# Patient Record
Sex: FEMALE | Race: BLACK OR AFRICAN AMERICAN | Employment: UNEMPLOYED | URBAN - METROPOLITAN AREA
[De-identification: names, ages, dates, MRNs, and addresses within clinical notes are randomized per-mention and may not be internally consistent; named-entity substitution may affect disease eponyms.]

---

## 2020-10-26 ENCOUNTER — HOSPITAL ENCOUNTER (INPATIENT)
Age: 65
LOS: 10 days | Discharge: HOME HEALTH | DRG: 336 | End: 2020-11-05
Attending: EMERGENCY MEDICINE | Admitting: COLON & RECTAL SURGERY
Payer: MEDICARE

## 2020-10-26 ENCOUNTER — APPOINTMENT (OUTPATIENT)
Dept: GENERAL RADIOLOGY | Age: 65
DRG: 336 | End: 2020-10-26
Attending: COLON & RECTAL SURGERY
Payer: MEDICARE

## 2020-10-26 ENCOUNTER — APPOINTMENT (OUTPATIENT)
Dept: CT IMAGING | Age: 65
DRG: 336 | End: 2020-10-26
Attending: EMERGENCY MEDICINE
Payer: MEDICARE

## 2020-10-26 ENCOUNTER — APPOINTMENT (OUTPATIENT)
Dept: GENERAL RADIOLOGY | Age: 65
DRG: 336 | End: 2020-10-26
Attending: EMERGENCY MEDICINE
Payer: MEDICARE

## 2020-10-26 DIAGNOSIS — K56.609 SBO (SMALL BOWEL OBSTRUCTION) (HCC): ICD-10-CM

## 2020-10-26 LAB
ABO + RH BLD: NORMAL
ALBUMIN SERPL-MCNC: 4.6 G/DL (ref 3.4–5)
ALBUMIN/GLOB SERPL: 1.2 {RATIO} (ref 0.8–1.7)
ALP SERPL-CCNC: 69 U/L (ref 45–117)
ALT SERPL-CCNC: 40 U/L (ref 13–56)
ANION GAP SERPL CALC-SCNC: 12 MMOL/L (ref 3–18)
APTT PPP: 26.8 SEC (ref 23–36.4)
AST SERPL-CCNC: 30 U/L (ref 10–38)
ATRIAL RATE: 59 BPM
BASOPHILS # BLD: 0 K/UL (ref 0–0.1)
BASOPHILS NFR BLD: 0 % (ref 0–2)
BILIRUB SERPL-MCNC: 0.6 MG/DL (ref 0.2–1)
BLOOD GROUP ANTIBODIES SERPL: NORMAL
BUN SERPL-MCNC: 16 MG/DL (ref 7–18)
BUN/CREAT SERPL: 16 (ref 12–20)
CALCIUM SERPL-MCNC: 9.8 MG/DL (ref 8.5–10.1)
CALCULATED P AXIS, ECG09: 41 DEGREES
CALCULATED R AXIS, ECG10: 54 DEGREES
CALCULATED T AXIS, ECG11: 58 DEGREES
CHLORIDE SERPL-SCNC: 98 MMOL/L (ref 100–111)
CO2 SERPL-SCNC: 24 MMOL/L (ref 21–32)
COVID-19 RAPID TEST, COVR: NOT DETECTED
CREAT SERPL-MCNC: 0.97 MG/DL (ref 0.6–1.3)
DIAGNOSIS, 93000: NORMAL
DIFFERENTIAL METHOD BLD: ABNORMAL
EOSINOPHIL # BLD: 0 K/UL (ref 0–0.4)
EOSINOPHIL NFR BLD: 0 % (ref 0–5)
ERYTHROCYTE [DISTWIDTH] IN BLOOD BY AUTOMATED COUNT: 12.5 % (ref 11.6–14.5)
GLOBULIN SER CALC-MCNC: 3.7 G/DL (ref 2–4)
GLUCOSE SERPL-MCNC: 138 MG/DL (ref 74–99)
HCT VFR BLD AUTO: 34.3 % (ref 35–45)
HGB BLD-MCNC: 12 G/DL (ref 12–16)
INR PPP: 1.1 (ref 0.8–1.2)
LACTATE BLD-SCNC: 1.37 MMOL/L (ref 0.4–2)
LIPASE SERPL-CCNC: 208 U/L (ref 73–393)
LYMPHOCYTES # BLD: 1.1 K/UL (ref 0.9–3.6)
LYMPHOCYTES NFR BLD: 15 % (ref 21–52)
MCH RBC QN AUTO: 30.9 PG (ref 24–34)
MCHC RBC AUTO-ENTMCNC: 35 G/DL (ref 31–37)
MCV RBC AUTO: 88.4 FL (ref 74–97)
MONOCYTES # BLD: 0.3 K/UL (ref 0.05–1.2)
MONOCYTES NFR BLD: 3 % (ref 3–10)
NEUTS SEG # BLD: 6 K/UL (ref 1.8–8)
NEUTS SEG NFR BLD: 82 % (ref 40–73)
P-R INTERVAL, ECG05: 84 MS
PLATELET # BLD AUTO: 209 K/UL (ref 135–420)
PMV BLD AUTO: 10.2 FL (ref 9.2–11.8)
POTASSIUM SERPL-SCNC: 4.1 MMOL/L (ref 3.5–5.5)
PROT SERPL-MCNC: 8.3 G/DL (ref 6.4–8.2)
PROTHROMBIN TIME: 13.7 SEC (ref 11.5–15.2)
Q-T INTERVAL, ECG07: 448 MS
QRS DURATION, ECG06: 108 MS
QTC CALCULATION (BEZET), ECG08: 443 MS
RBC # BLD AUTO: 3.88 M/UL (ref 4.2–5.3)
SODIUM SERPL-SCNC: 134 MMOL/L (ref 136–145)
SOURCE, COVRS: NORMAL
SPECIMEN EXP DATE BLD: NORMAL
SPECIMEN TYPE, XMCV1T: NORMAL
TROPONIN I SERPL-MCNC: <0.02 NG/ML (ref 0–0.04)
VENTRICULAR RATE, ECG03: 59 BPM
WBC # BLD AUTO: 7.3 K/UL (ref 4.6–13.2)

## 2020-10-26 PROCEDURE — 85730 THROMBOPLASTIN TIME PARTIAL: CPT

## 2020-10-26 PROCEDURE — 96376 TX/PRO/DX INJ SAME DRUG ADON: CPT

## 2020-10-26 PROCEDURE — 85025 COMPLETE CBC W/AUTO DIFF WBC: CPT

## 2020-10-26 PROCEDURE — 96374 THER/PROPH/DIAG INJ IV PUSH: CPT

## 2020-10-26 PROCEDURE — 2709999900 HC NON-CHARGEABLE SUPPLY

## 2020-10-26 PROCEDURE — 87635 SARS-COV-2 COVID-19 AMP PRB: CPT

## 2020-10-26 PROCEDURE — 74011000250 HC RX REV CODE- 250: Performed by: COLON & RECTAL SURGERY

## 2020-10-26 PROCEDURE — 99223 1ST HOSP IP/OBS HIGH 75: CPT | Performed by: COLON & RECTAL SURGERY

## 2020-10-26 PROCEDURE — 74177 CT ABD & PELVIS W/CONTRAST: CPT

## 2020-10-26 PROCEDURE — 74011250636 HC RX REV CODE- 250/636: Performed by: EMERGENCY MEDICINE

## 2020-10-26 PROCEDURE — 99283 EMERGENCY DEPT VISIT LOW MDM: CPT

## 2020-10-26 PROCEDURE — 84484 ASSAY OF TROPONIN QUANT: CPT

## 2020-10-26 PROCEDURE — 43753 TX GASTRO INTUB W/ASP: CPT

## 2020-10-26 PROCEDURE — 74011250637 HC RX REV CODE- 250/637: Performed by: COLON & RECTAL SURGERY

## 2020-10-26 PROCEDURE — 80053 COMPREHEN METABOLIC PANEL: CPT

## 2020-10-26 PROCEDURE — 74018 RADEX ABDOMEN 1 VIEW: CPT

## 2020-10-26 PROCEDURE — 96375 TX/PRO/DX INJ NEW DRUG ADDON: CPT

## 2020-10-26 PROCEDURE — 85610 PROTHROMBIN TIME: CPT

## 2020-10-26 PROCEDURE — 86900 BLOOD TYPING SEROLOGIC ABO: CPT

## 2020-10-26 PROCEDURE — 74011000636 HC RX REV CODE- 636: Performed by: EMERGENCY MEDICINE

## 2020-10-26 PROCEDURE — 77030008771 HC TU NG SALEM SUMP -A

## 2020-10-26 PROCEDURE — 83690 ASSAY OF LIPASE: CPT

## 2020-10-26 PROCEDURE — 65270000029 HC RM PRIVATE

## 2020-10-26 PROCEDURE — 74011250636 HC RX REV CODE- 250/636: Performed by: COLON & RECTAL SURGERY

## 2020-10-26 PROCEDURE — 71045 X-RAY EXAM CHEST 1 VIEW: CPT

## 2020-10-26 PROCEDURE — 93005 ELECTROCARDIOGRAM TRACING: CPT

## 2020-10-26 PROCEDURE — 83605 ASSAY OF LACTIC ACID: CPT

## 2020-10-26 RX ORDER — ONDANSETRON 2 MG/ML
INJECTION INTRAMUSCULAR; INTRAVENOUS
Status: DISPENSED
Start: 2020-10-26 | End: 2020-10-26

## 2020-10-26 RX ORDER — DIPHENHYDRAMINE HYDROCHLORIDE 50 MG/ML
25 INJECTION, SOLUTION INTRAMUSCULAR; INTRAVENOUS
Status: DISCONTINUED | OUTPATIENT
Start: 2020-10-26 | End: 2020-11-05 | Stop reason: HOSPADM

## 2020-10-26 RX ORDER — ROSUVASTATIN CALCIUM 5 MG/1
5 TABLET, COATED ORAL
COMMUNITY

## 2020-10-26 RX ORDER — MORPHINE SULFATE 4 MG/ML
4 INJECTION, SOLUTION INTRAMUSCULAR; INTRAVENOUS
Status: COMPLETED | OUTPATIENT
Start: 2020-10-26 | End: 2020-10-26

## 2020-10-26 RX ORDER — METOPROLOL TARTRATE 25 MG/1
25 TABLET, FILM COATED ORAL 2 TIMES DAILY
COMMUNITY

## 2020-10-26 RX ORDER — MORPHINE SULFATE 2 MG/ML
2-4 INJECTION, SOLUTION INTRAMUSCULAR; INTRAVENOUS
Status: DISCONTINUED | OUTPATIENT
Start: 2020-10-26 | End: 2020-11-05 | Stop reason: HOSPADM

## 2020-10-26 RX ORDER — MORPHINE SULFATE 2 MG/ML
2 INJECTION, SOLUTION INTRAMUSCULAR; INTRAVENOUS
Status: DISCONTINUED | OUTPATIENT
Start: 2020-10-26 | End: 2020-10-26

## 2020-10-26 RX ORDER — HEPARIN SODIUM 5000 [USP'U]/ML
5000 INJECTION, SOLUTION INTRAVENOUS; SUBCUTANEOUS EVERY 8 HOURS
Status: DISCONTINUED | OUTPATIENT
Start: 2020-10-26 | End: 2020-11-05 | Stop reason: HOSPADM

## 2020-10-26 RX ORDER — SODIUM CHLORIDE, SODIUM LACTATE, POTASSIUM CHLORIDE, CALCIUM CHLORIDE 600; 310; 30; 20 MG/100ML; MG/100ML; MG/100ML; MG/100ML
125 INJECTION, SOLUTION INTRAVENOUS CONTINUOUS
Status: DISCONTINUED | OUTPATIENT
Start: 2020-10-26 | End: 2020-11-02

## 2020-10-26 RX ORDER — ONDANSETRON 2 MG/ML
4 INJECTION INTRAMUSCULAR; INTRAVENOUS ONCE
Status: COMPLETED | OUTPATIENT
Start: 2020-10-26 | End: 2020-10-26

## 2020-10-26 RX ORDER — CLONIDINE 0.1 MG/24H
1 PATCH, EXTENDED RELEASE TRANSDERMAL
Status: DISCONTINUED | OUTPATIENT
Start: 2020-10-26 | End: 2020-11-05 | Stop reason: HOSPADM

## 2020-10-26 RX ORDER — MORPHINE SULFATE 4 MG/ML
4 INJECTION, SOLUTION INTRAMUSCULAR; INTRAVENOUS
Status: DISCONTINUED | OUTPATIENT
Start: 2020-10-26 | End: 2020-10-26 | Stop reason: SDUPTHER

## 2020-10-26 RX ORDER — ONDANSETRON 2 MG/ML
4 INJECTION INTRAMUSCULAR; INTRAVENOUS
Status: DISCONTINUED | OUTPATIENT
Start: 2020-10-26 | End: 2020-11-05 | Stop reason: HOSPADM

## 2020-10-26 RX ORDER — LISINOPRIL AND HYDROCHLOROTHIAZIDE 20; 25 MG/1; MG/1
1 TABLET ORAL DAILY
COMMUNITY

## 2020-10-26 RX ORDER — AMLODIPINE BESYLATE 5 MG/1
5 TABLET ORAL DAILY
COMMUNITY

## 2020-10-26 RX ADMIN — MORPHINE SULFATE 2 MG: 2 INJECTION, SOLUTION INTRAMUSCULAR; INTRAVENOUS at 22:46

## 2020-10-26 RX ADMIN — MORPHINE SULFATE 4 MG: 4 INJECTION, SOLUTION INTRAMUSCULAR; INTRAVENOUS at 04:11

## 2020-10-26 RX ADMIN — FAMOTIDINE 20 MG: 10 INJECTION INTRAVENOUS at 08:34

## 2020-10-26 RX ADMIN — ONDANSETRON 4 MG: 2 INJECTION INTRAMUSCULAR; INTRAVENOUS at 12:53

## 2020-10-26 RX ADMIN — ONDANSETRON 4 MG: 2 INJECTION INTRAMUSCULAR; INTRAVENOUS at 04:11

## 2020-10-26 RX ADMIN — MORPHINE SULFATE 2 MG: 2 INJECTION, SOLUTION INTRAMUSCULAR; INTRAVENOUS at 20:19

## 2020-10-26 RX ADMIN — HEPARIN SODIUM 5000 UNITS: 5000 INJECTION INTRAVENOUS; SUBCUTANEOUS at 15:08

## 2020-10-26 RX ADMIN — SODIUM CHLORIDE, SODIUM LACTATE, POTASSIUM CHLORIDE, AND CALCIUM CHLORIDE 125 ML/HR: 600; 310; 30; 20 INJECTION, SOLUTION INTRAVENOUS at 22:47

## 2020-10-26 RX ADMIN — MORPHINE SULFATE 2 MG: 2 INJECTION, SOLUTION INTRAMUSCULAR; INTRAVENOUS at 12:45

## 2020-10-26 RX ADMIN — MORPHINE SULFATE 4 MG: 2 INJECTION, SOLUTION INTRAMUSCULAR; INTRAVENOUS at 09:18

## 2020-10-26 RX ADMIN — MORPHINE SULFATE 4 MG: 4 INJECTION, SOLUTION INTRAMUSCULAR; INTRAVENOUS at 06:10

## 2020-10-26 RX ADMIN — ONDANSETRON 4 MG: 2 INJECTION INTRAMUSCULAR; INTRAVENOUS at 20:19

## 2020-10-26 RX ADMIN — MORPHINE SULFATE 4 MG: 4 INJECTION, SOLUTION INTRAMUSCULAR; INTRAVENOUS at 02:13

## 2020-10-26 RX ADMIN — IOPAMIDOL 100 ML: 612 INJECTION, SOLUTION INTRAVENOUS at 04:26

## 2020-10-26 RX ADMIN — SODIUM CHLORIDE, SODIUM LACTATE, POTASSIUM CHLORIDE, AND CALCIUM CHLORIDE 125 ML/HR: 600; 310; 30; 20 INJECTION, SOLUTION INTRAVENOUS at 07:29

## 2020-10-26 RX ADMIN — ONDANSETRON 4 MG: 2 INJECTION INTRAMUSCULAR; INTRAVENOUS at 02:13

## 2020-10-26 NOTE — ED TRIAGE NOTES
Patient complaints of diffused lower abdominal pain for the past 2 hours and denies any nausea,vomiting and diarrhea symptoms at this time. Last BM was last night around 7 pm and had to strain per patient.

## 2020-10-26 NOTE — PROGRESS NOTES
Bedside and Verbal shift change report given to Luis Byrnes RN (oncoming nurse) by Curt Gonzalez (offgoing nurse). Report included the following information SBAR and Kardex.

## 2020-10-26 NOTE — H&P
HPI: Philip Candelario is a 71 y.o. female presenting with chief complain of abdominal pain. Asked to see patient as an emergency consult, stat, by emergency room physician. Patient developed abdominal pain at approximately 6 PM yesterday evening. The pain was severe, diffuse, more so in the lower abdomen. She denies any prior history of abdominal pain issues. She is status post hysterectomy. She has no history of small bowel obstruction. She denies diarrhea. Although she did not vomit at home she had one vomiting episode in the emergency room. Since admission to the ER nasogastric decompression tube has been placed. PMH: hypertension    PSH: hysterectomy    [address] negative for gastrointestinal disorders    Social History     Socioeconomic History    Marital status:      Spouse name: Not on file    Number of children: Not on file    Years of education: Not on file    Highest education level: Not on file   Tobacco Use    Smoking status: Former Smoker    Smokeless tobacco: Never Used   Substance and Sexual Activity    Drug use: Never       Review of Systems -aside from the above, all others negative      No Known Allergies    Vitals:    10/26/20 0049   BP: 128/74   Pulse: 63   Resp: 20   Temp: 98.3 °F (36.8 °C)   SpO2: 99%   Weight: 56.7 kg (125 lb)   Height: 5' 3\" (1.6 m)       Physical Exam  Constitutional:       Appearance: She is well-developed. HENT:      Head: Normocephalic and atraumatic. Eyes:      Conjunctiva/sclera: Conjunctivae normal.   Abdominal:      General: There is no distension. Palpations: Abdomen is soft. Tenderness: There is abdominal tenderness (Moderate, diffuse). There is no guarding. Hernia: No hernia is present. Musculoskeletal: Normal range of motion. Lymphadenopathy:      Cervical: No cervical adenopathy. Skin:     General: Skin is warm and dry. Findings: No rash. Neurological:      Sensory: No sensory deficit.    Psychiatric:         Speech: Speech normal.       CMP:   Lab Results   Component Value Date/Time     (L) 10/26/2020 02:02 AM    K 4.1 10/26/2020 02:02 AM    CL 98 (L) 10/26/2020 02:02 AM    CO2 24 10/26/2020 02:02 AM    AGAP 12 10/26/2020 02:02 AM     (H) 10/26/2020 02:02 AM    BUN 16 10/26/2020 02:02 AM    CREA 0.97 10/26/2020 02:02 AM    GFRAA >60 10/26/2020 02:02 AM    GFRNA 58 (L) 10/26/2020 02:02 AM    CA 9.8 10/26/2020 02:02 AM    ALB 4.6 10/26/2020 02:02 AM    TP 8.3 (H) 10/26/2020 02:02 AM    GLOB 3.7 10/26/2020 02:02 AM    AGRAT 1.2 10/26/2020 02:02 AM    ALT 40 10/26/2020 02:02 AM     CBC:   Lab Results   Component Value Date/Time    WBC 7.3 10/26/2020 02:02 AM    HGB 12.0 10/26/2020 02:02 AM    HCT 34.3 (L) 10/26/2020 02:02 AM     10/26/2020 02:02 AM     Lactate 1.3    CT abdomen pelvis personally visualized by me; there is a dilated loop in the pelvis which might raise concern for a closed-loop small bowel obstruction, however there are also dilated loops in the upper abdomen. There is no substantial free fluid, nor free air. The bowel loops do not appear particularly thickened. Assessment / Plan    Small bowel obstruction, possibly a closed-loop obstruction  We will admit the patient for nasogastric decompression and close observation  If her symptoms do not improve, she may require exploratory laparotomy  Hold BP meds for now  Type and screen sent, Covid test negative    The diagnoses and plan were discussed with the patient. All questions answered. Plan of care agreed to by all concerned.

## 2020-10-26 NOTE — PROGRESS NOTES
Pt states pain substantially improved.   Afebrile  Abd soft, mild tenderness lower abdomen, ND    SBO  Continue conservatives measures  Follow closely

## 2020-10-26 NOTE — ED PROVIDER NOTES
EMERGENCY DEPARTMENT HISTORY AND PHYSICAL EXAM      Date: 10/26/2020  Patient Name: [facility]    History of Presenting Illness     Chief Complaint   Patient presents with    Abdominal Pain       History (Context): [facility] is a 71 y.o. Lynnette Nancy with hypertension, hyperlipidemia and a remote YOGI, who presents with 1 hour of acute onset, severe, persistent suprapubic abdominal pain associated with nausea and vomiting. The patient pain is described as sharp and stabbing. The pain was so severe that it woke her out of a deep sleep. No clear exacerbating/relieving features or other associated symptoms. On review of systems, the patient denies fever, chills, diarrhea, back pain, chest pain, shortness of breath, diaphoresis, rashes, tick bite, recent travel. PCP: Sudha Mcgee MD    Current Facility-Administered Medications   Medication Dose Route Frequency Provider Last Rate Last Dose    piperacillin-tazobactam (ZOSYN) 3.375 g in 0.9% sodium chloride (MBP/ADV) 100 mL MBP  3.375 g IntraVENous NOW Julita Stone MD        diphenhydrAMINE (BENADRYL) injection 25 mg  25 mg IntraVENous Q6H PRN Josie Dowell MD        heparin (porcine) injection 5,000 Units  5,000 Units SubCUTAneous Q8H Josie Dowell MD        lactated Ringers infusion  125 mL/hr IntraVENous CONTINUOUS Josie Dowell MD        famotidine (PF) (PEPCID) 20 mg in 0.9% sodium chloride 10 mL injection  20 mg IntraVENous Q12H Josie Dowell MD        ondansetron [facility]) injection 4 mg  4 mg IntraVENous Q6H PRN Josie Dowell MD        morphine injection 2-4 mg  2-4 mg IntraVENous Q3H PRN Josie Dowell MD           Past History     Past Medical History:  Hypertension  Hyperlipidemia    Past Surgical History:  History reviewed. No pertinent surgical history. Family History:  History reviewed. No pertinent family history.     Social History:  Social History     Tobacco Use    Smoking status: Former Smoker  Smokeless tobacco: Never Used   Substance Use Topics    Alcohol use: Not on file    Drug use: Never       Allergies:  No Known Allergies      Review of Systems   As per HPI, otherwise reviewed and negative. Physical Exam     Vitals:    10/26/20 0049   BP: 128/74   Pulse: 63   Resp: 20   Temp: 98.3 °F (36.8 °C)   SpO2: 99%   Weight: 56.7 kg (125 lb)   Height: 5' 3\" (1.6 m)       Gen: Very uncomfortable, in very severe pain  HEENT: Normocephalic, sclera anicteric  Cardiovascular: Normal rate, regular rhythm, no murmurs, rubs, gallops. Pulses intact and equal distally. Pulmonary: No respiratory distress. No stridor. Clear lungs. ABD: Soft,  tender in the suprapubic area, nondistended. Neuro: Alert. Normal speech. Normal mentation. Psych: Normal thought content and thought processes. : No CVA tenderness  EXT: Moves all extremities well. No cyanosis or clubbing. Skin: Warm and well-perfused. Diagnostic Study Results     Labs -     Recent Results (from the past 12 hour(s))   CBC WITH AUTOMATED DIFF    Collection Time: 10/26/20  2:02 AM   Result Value Ref Range    WBC 7.3 4.6 - 13.2 K/uL    RBC 3.88 (L) 4.20 - 5.30 M/uL    HGB 12.0 12.0 - 16.0 g/dL    HCT 34.3 (L) 35.0 - 45.0 %    MCV 88.4 74.0 - 97.0 FL    MCH 30.9 24.0 - 34.0 PG    MCHC 35.0 31.0 - 37.0 g/dL    RDW 12.5 11.6 - 14.5 %    PLATELET 312 047 - 386 K/uL    MPV 10.2 9.2 - 11.8 FL    NEUTROPHILS 82 (H) 40 - 73 %    LYMPHOCYTES 15 (L) 21 - 52 %    MONOCYTES 3 3 - 10 %    EOSINOPHILS 0 0 - 5 %    BASOPHILS 0 0 - 2 %    ABS. NEUTROPHILS 6.0 1.8 - 8.0 K/UL    ABS. LYMPHOCYTES 1.1 0.9 - 3.6 K/UL    ABS. MONOCYTES 0.3 0.05 - 1.2 K/UL    ABS. EOSINOPHILS 0.0 0.0 - 0.4 K/UL    ABS.  BASOPHILS 0.0 0.0 - 0.1 K/UL    DF AUTOMATED     METABOLIC PANEL, COMPREHENSIVE    Collection Time: 10/26/20  2:02 AM   Result Value Ref Range    Sodium 134 (L) 136 - 145 mmol/L    Potassium 4.1 3.5 - 5.5 mmol/L    Chloride 98 (L) 100 - 111 mmol/L    CO2 24 21 - 32 mmol/L    Anion gap 12 3.0 - 18 mmol/L    Glucose 138 (H) 74 - 99 mg/dL    BUN 16 7.0 - 18 MG/DL    Creatinine 0.97 0.6 - 1.3 MG/DL    BUN/Creatinine ratio 16 12 - 20      GFR est AA >60 >60 ml/min/1.73m2    GFR est non-AA 58 (L) >60 ml/min/1.73m2    Calcium 9.8 8.5 - 10.1 MG/DL    Bilirubin, total 0.6 0.2 - 1.0 MG/DL    ALT (SGPT) 40 13 - 56 U/L    AST (SGOT) 30 10 - 38 U/L    Alk. phosphatase 69 45 - 117 U/L    Protein, total 8.3 (H) 6.4 - 8.2 g/dL    Albumin 4.6 3.4 - 5.0 g/dL    Globulin 3.7 2.0 - 4.0 g/dL    A-G Ratio 1.2 0.8 - 1.7     LIPASE    Collection Time: 10/26/20  2:02 AM   Result Value Ref Range    Lipase 208 73 - 393 U/L   TROPONIN I    Collection Time: 10/26/20  2:02 AM   Result Value Ref Range    Troponin-I, QT <0.02 0.0 - 0.045 NG/ML   EKG, 12 LEAD, INITIAL    Collection Time: 10/26/20  3:39 AM   Result Value Ref Range    Ventricular Rate 59 BPM    Atrial Rate 59 BPM    P-R Interval 84 ms    QRS Duration 108 ms    Q-T Interval 448 ms    QTC Calculation (Bezet) 443 ms    Calculated P Axis 41 degrees    Calculated R Axis 54 degrees    Calculated T Axis 58 degrees    Diagnosis       Sinus bradycardia with short MS  Otherwise normal ECG  No previous ECGs available     TYPE & SCREEN    Collection Time: 10/26/20  5:00 AM   Result Value Ref Range    Crossmatch Expiration 10/29/2020,2359     ABO/Rh(D) B POSITIVE     Antibody screen NEG    SARS-COV-2    Collection Time: 10/26/20  5:40 AM   Result Value Ref Range    Specimen source Nasopharyngeal      COVID-19 rapid test Not detected NOTD      Specimen type NP Swab     POC LACTIC ACID    Collection Time: 10/26/20  5:43 AM   Result Value Ref Range    Lactic Acid (POC) 1.37 0.40 - 2.00 mmol/L       Radiologic Studies -   CT ABD PELV W CONT   Final Result   IMPRESSION:    1.  Fluid-filled and focally dilated small bowel loop in the pelvis, with   associated wall thickening and surrounding fluid, as well as abrupt transition   points at either and, is concerning for closed loop bowel obstruction. -Multiple additional mildly dilated and fluid-filled small bowel loops in the   mid abdomen, without wall thickening or surrounding stranding, may represent   another point of early or partial obstruction   -Recommend surgical consultation      Enedina Gay M.D. discussed the above with Dr. Davon Weaver at 4:55 AM on   10/26/2020. XR CHEST PORT    (Results Pending)   XR ABD PORT  1 V    (Results Pending)     CT Results  (Last 48 hours)               10/26/20 0426  CT ABD PELV W CONT Final result    Impression:  IMPRESSION:    1. Fluid-filled and focally dilated small bowel loop in the pelvis, with   associated wall thickening and surrounding fluid, as well as abrupt transition   points at either and, is concerning for closed loop bowel obstruction. -Multiple additional mildly dilated and fluid-filled small bowel loops in the   mid abdomen, without wall thickening or surrounding stranding, may represent   another point of early or partial obstruction   -Recommend surgical consultation       Enedina Gay M.D. discussed the above with Dr. Davon Weaver at 4:55 AM on   10/26/2020. Narrative:  EXAM: CT ABDOMEN AND PELVIS WITH CONTRAST       CLINICAL INDICATION/HISTORY: Abdominal pain . Diffuse lower abdominal pain for 2   hours prior to arrival.        COMPARISON: None       TECHNIQUE:  CT abdomen and pelvis with 100 cc of Isovue-300 IV contrast.   All CT scans at this facility are performed using dose optimization technique as   appropriate to the performed examination, to include automated exposure control,   adjustment of the mA and/or kV according to patient's size (including   appropriate matching for site-specific examinations), or use of an iterative   reconstruction technique. FINDINGS:        Lower chest: Unremarkable. Liver: A few tiny hypodensities in the liver are too small to characterize, but   likely benign.         Biliary: Unremarkable. Pancreas: Unremarkable. Spleen: Unremarkable. Adrenal glands: Unremarkable. Kidneys: No hydronephrosis or renal stones. Few subcentimeter hypodensities in   the bilateral kidneys are too small to characterize, but likely cysts. Reproductive organs: Hysterectomy. Bladder: Unremarkable. Bowel: The majority of the small bowel appears normal. However, in the mid   abdomen, there are a fewer loops of mildly prominent fluid-filled small bowel,   without wall thickening. A transition to normal caliber bowel is seen in the   left mid abdomen on coronal image 25 and in the mid abdomen on coronal image 28. Just distal to this transition point, there is another small bowel loop in the   pelvis which is distended with fluid to 2.5 cm, with abrupt transition at both   ends (series 2, image 63) and associated wall thickening. Lymph nodes: No pathologically enlarged lymph nodes. Vasculature: Moderate burden of calcified atherosclerotic disease. Other: Small amount of free fluid in the pelvis. Body wall: Unremarkable. Bones: No acute osseous abnormality. CXR Results  (Last 48 hours)    None            Medical Decision Making   I am the first provider for this patient. I reviewed the vital signs, available nursing notes, past medical history, past surgical history, family history and social history. Vital Signs-Reviewed the patient's vital signs. EKG: Interpreted by myself. Records Reviewed: By myself personally on initial evaluation    MDM:   Patient presents with abdominal pain. Exam significant for tenderness in the suprapubic area. Given the abrupt onset and severity of abdominal pain, there is almost assuredly an acute surgical emergency. The patient does not frequent emergency rooms, and the pain woke her up out of her sleep.   This history signifies pain is ischemic in nature, and the patient will likely need an operation in the coming hours. DDX considered: Perforated hollow viscus, ureterolithiasis, SBO, mesenteric ischemia, gastroparesis, aortic dissection      Plan:   Pain Control  Antiemetics  Close Observation    Orders as below:  Orders Placed This Encounter    CT ABD PELV W CONT    XR CHEST PORT    XR ABD PORT  1 V    CBC WITH AUTOMATED DIFF    COMPREHENSIVE METABOLIC PANEL    LIPASE    TROPONIN I    PROTHROMBIN TIME + INR    PTT    LACTIC ACID    SARS-COV-2    METABOLIC PANEL, BASIC    CBC W/O DIFF    MAGNESIUM    PHOSPHORUS    DIET NPO Except Meds    NG TUBE, INSERTION    INTAKE AND OUTPUT    VITAL SIGNS PER UNIT ROUTINE    NG TUBE TO SUCTION CONTINUOUS Type of suction: Intermittent; Suction Settings: Low    POC LACTIC ACID    EKG, 12 LEAD, INITIAL    TYPE & SCREEN    morphine injection 4 mg    ondansetron (ZOFRAN) 4 mg/2 mL injection    ondansetron (ZOFRAN) injection 4 mg    morphine injection 4 mg    ondansetron (ZOFRAN) injection 4 mg    piperacillin-tazobactam (ZOSYN) 3.375 g in 0.9% sodium chloride (MBP/ADV) 100 mL MBP    ondansetron (ZOFRAN) injection 4 mg    morphine injection 2-4 mg    INITIAL PHYSICIAN ORDER: INPATIENT Surgical; 1. Patient Failed outpatient treatment (further clarification in H&P documentation)        ED Course:   ED Course as of Oct 26 0635   Mon Oct 26, 2020   5191 After I personally reviewed the CT scan and determined I felt it was a closed-loop obstruction, especially given the patient's presentation, the radiologist called and independently suggested a closed-loop obstruction. The patient has pain local to the area where the closed-loop obstruction would be, and further she has a history of a hysterectomy in that region.   However, in consultation with general surgery, Dr. Mona Ferrara, he says he thinks this is a \"mild partial small bowel obstruction,\" and asked me to [facility] admit this patient to the hospitalist, and I will see her sometime this morning. \"  I have insisted upon his immediate consultation in the emergency room. He will oblige. [DT]   4999 According to surgery, the patient will be admitted and not have surgery. I have quoted Bhavna et al who clearly demonstrated the error in this thought process and the overreliance of clinical signs, over history and radiologic signs: \"Early recognition of intestinal strangulation in patients with small bowel obstruction is essential to allow safe nonoperative management of selected patients. We prospectively evaluated preoperative diagnostic parameters as well as the preoperative judgement of the senior attending surgeon for the determination of the presence or absence of intestinal strangulation in 51 consecutive patients who were about to undergo laparotomy for complete mechanical small bowel obstruction. Strangulation was present in 21 (42 percent) of the 51 patients. No preoperative clinical parameter, including the presence of continuous abdominal pain, fever, peritoneal signs, leukocytosis, or acidosis, or a combination thereof proved to be sensitive, specific, and predictive for strangulation. Moreover, the senior surgeon's experienced clinical judgement detected strangulation in only 10 of 21 patients with strangulation preoperatively (sensitivity, 48 percent). Indeed, only 1 of these 10 patients had an early, reversible lesion, whereas 9 had advanced, irreversible infarction. Only 25 of 36 preoperative assessments of simple obstruction proved correct (predictive value of an assessment of no strangulation, 69 percent). Overall, the preoperative assessment was correct in only 35 of the 51 patients (efficiency, 70 percent). These data show that in patients with complete mechanical small bowel obstruction, the preoperative diagnosis of strangulation cannot be made or excluded reliably by any known clinical parameter, combination of parameters, or by experienced clinical judgement. .. [DT]   3115 . Diandra Boop Diandra Boop Nonoperative management of complete intestinal obstruction is therefore undertaken at a calculated risk (31 +/- 51 percent in the present series) of delaying definitive treatment of intestinal ischemia. \"      [DT]      ED Course User Index  [DT] Royce Polk MD         Disposition:  Admit      Critical Care Time:       Diagnosis     Clinical Impression:   1. SBO (small bowel obstruction) ([facility])        Signed,  Gaetano Garcia MD  Emergency Physician  SHIRIN Blair    As a voice dictation software was utilized to dictate this note, minor word transpositions can occur. I apologize for confusing wording and typographic errors. Please feel free to contact me for clarification.

## 2020-10-27 LAB
ANION GAP SERPL CALC-SCNC: 7 MMOL/L (ref 3–18)
BUN SERPL-MCNC: 13 MG/DL (ref 7–18)
BUN/CREAT SERPL: 17 (ref 12–20)
CALCIUM SERPL-MCNC: 9.2 MG/DL (ref 8.5–10.1)
CHLORIDE SERPL-SCNC: 100 MMOL/L (ref 100–111)
CO2 SERPL-SCNC: 29 MMOL/L (ref 21–32)
CREAT SERPL-MCNC: 0.76 MG/DL (ref 0.6–1.3)
ERYTHROCYTE [DISTWIDTH] IN BLOOD BY AUTOMATED COUNT: 12.4 % (ref 11.6–14.5)
GLUCOSE SERPL-MCNC: 116 MG/DL (ref 74–99)
HCT VFR BLD AUTO: 35.5 % (ref 35–45)
HGB BLD-MCNC: 12.2 G/DL (ref 12–16)
MAGNESIUM SERPL-MCNC: 2.1 MG/DL (ref 1.6–2.6)
MCH RBC QN AUTO: 30.4 PG (ref 24–34)
MCHC RBC AUTO-ENTMCNC: 34.4 G/DL (ref 31–37)
MCV RBC AUTO: 88.5 FL (ref 74–97)
PHOSPHATE SERPL-MCNC: 3.6 MG/DL (ref 2.5–4.9)
PLATELET # BLD AUTO: 207 K/UL (ref 135–420)
PMV BLD AUTO: 9.7 FL (ref 9.2–11.8)
POTASSIUM SERPL-SCNC: 3.2 MMOL/L (ref 3.5–5.5)
RBC # BLD AUTO: 4.01 M/UL (ref 4.2–5.3)
SODIUM SERPL-SCNC: 136 MMOL/L (ref 136–145)
WBC # BLD AUTO: 8.3 K/UL (ref 4.6–13.2)

## 2020-10-27 PROCEDURE — 74011250636 HC RX REV CODE- 250/636: Performed by: COLON & RECTAL SURGERY

## 2020-10-27 PROCEDURE — 74011000250 HC RX REV CODE- 250: Performed by: COLON & RECTAL SURGERY

## 2020-10-27 PROCEDURE — 65270000029 HC RM PRIVATE

## 2020-10-27 PROCEDURE — 84100 ASSAY OF PHOSPHORUS: CPT

## 2020-10-27 PROCEDURE — 80048 BASIC METABOLIC PNL TOTAL CA: CPT

## 2020-10-27 PROCEDURE — 99232 SBSQ HOSP IP/OBS MODERATE 35: CPT | Performed by: COLON & RECTAL SURGERY

## 2020-10-27 PROCEDURE — 36415 COLL VENOUS BLD VENIPUNCTURE: CPT

## 2020-10-27 PROCEDURE — 85027 COMPLETE CBC AUTOMATED: CPT

## 2020-10-27 PROCEDURE — 2709999900 HC NON-CHARGEABLE SUPPLY

## 2020-10-27 PROCEDURE — 83735 ASSAY OF MAGNESIUM: CPT

## 2020-10-27 RX ORDER — POTASSIUM CHLORIDE 7.45 MG/ML
10 INJECTION INTRAVENOUS
Status: COMPLETED | OUTPATIENT
Start: 2020-10-27 | End: 2020-10-27

## 2020-10-27 RX ADMIN — MORPHINE SULFATE 4 MG: 2 INJECTION, SOLUTION INTRAMUSCULAR; INTRAVENOUS at 09:03

## 2020-10-27 RX ADMIN — POTASSIUM CHLORIDE 10 MEQ: 7.46 INJECTION, SOLUTION INTRAVENOUS at 10:05

## 2020-10-27 RX ADMIN — MORPHINE SULFATE 2 MG: 2 INJECTION, SOLUTION INTRAMUSCULAR; INTRAVENOUS at 14:42

## 2020-10-27 RX ADMIN — HEPARIN SODIUM 5000 UNITS: 5000 INJECTION INTRAVENOUS; SUBCUTANEOUS at 21:51

## 2020-10-27 RX ADMIN — POTASSIUM CHLORIDE 10 MEQ: 7.46 INJECTION, SOLUTION INTRAVENOUS at 07:42

## 2020-10-27 RX ADMIN — POTASSIUM CHLORIDE 10 MEQ: 7.46 INJECTION, SOLUTION INTRAVENOUS at 08:58

## 2020-10-27 RX ADMIN — FAMOTIDINE 20 MG: 10 INJECTION INTRAVENOUS at 09:03

## 2020-10-27 RX ADMIN — HEPARIN SODIUM 5000 UNITS: 5000 INJECTION INTRAVENOUS; SUBCUTANEOUS at 14:42

## 2020-10-27 RX ADMIN — FAMOTIDINE 20 MG: 10 INJECTION INTRAVENOUS at 21:15

## 2020-10-27 RX ADMIN — SODIUM CHLORIDE, SODIUM LACTATE, POTASSIUM CHLORIDE, AND CALCIUM CHLORIDE 125 ML/HR: 600; 310; 30; 20 INJECTION, SOLUTION INTRAVENOUS at 06:11

## 2020-10-27 RX ADMIN — HEPARIN SODIUM 5000 UNITS: 5000 INJECTION INTRAVENOUS; SUBCUTANEOUS at 06:11

## 2020-10-27 RX ADMIN — MORPHINE SULFATE 2 MG: 2 INJECTION, SOLUTION INTRAMUSCULAR; INTRAVENOUS at 01:16

## 2020-10-27 NOTE — PROGRESS NOTES
[address] [facility] [address]  [phone]  Colon and Rectal Surgery Progress Note      Patient: [facility] MRN: [id number]  SSN: [id number]    YOB: 1955  Age: 71 y.o. Sex: female      Admit Date: 10/26/2020    LOS: 1 day     Subjective:     Pain intermittent. No bowel function. Objective:     Vitals:    10/26/20 1608 10/26/20 2034 10/27/20 0007 10/27/20 0359   BP: (!) 157/72 139/64 123/64 116/70   Pulse: 60 64 74 70   Resp: 16 18 16 18   Temp: 97.7 °F (36.5 °C) 98.2 °F (36.8 °C) 98 °F (36.7 °C) 98.7 °F (37.1 °C)   SpO2: 99% 99% 98% 97%   Weight:       Height:            Intake and Output:  Current Shift: No intake/output data recorded.   Last three shifts: 10/25 1901 - 10/27 0700  In: 1160.4 [I.V.:1160.4]  Out: 400 [Urine:400]    Physical Exam:     Constitutional: well developed, in NAD  Abdomen: soft, mildly tender R and LLQ, ND, no guarding    Lab/Data Review:    CMP:   Lab Results   Component Value Date/Time     10/27/2020 02:37 AM    K 3.2 (L) 10/27/2020 02:37 AM     10/27/2020 02:37 AM    CO2 29 10/27/2020 02:37 AM    AGAP 7 10/27/2020 02:37 AM     (H) 10/27/2020 02:37 AM    BUN 13 10/27/2020 02:37 AM    CREA 0.76 10/27/2020 02:37 AM    GFRAA >60 10/27/2020 02:37 AM    GFRNA >60 10/27/2020 02:37 AM    CA 9.2 10/27/2020 02:37 AM    MG 2.1 10/27/2020 02:37 AM    PHOS 3.6 10/27/2020 02:37 AM     CBC:   Lab Results   Component Value Date/Time    WBC 8.3 10/27/2020 02:37 AM    HGB 12.2 10/27/2020 02:37 AM    HCT 35.5 10/27/2020 02:37 AM     10/27/2020 02:37 AM        Assessment:     SBO    Plan:     Continue NPO, NG, IVF  Hypokalemia - replete K    Signed By: Mykel Maciel MD        October 27, 2020

## 2020-10-27 NOTE — PROGRESS NOTES
Problem: Pain  Goal: *Control of Pain  Outcome: Progressing Towards Goal     Problem: Patient Education: Go to Patient Education Activity  Goal: Patient/Family Education  Outcome: Progressing Towards Goal     Problem: Falls - Risk of  Goal: *Absence of Falls  Description: Document Ivbeau Kirkland Fall Risk and appropriate interventions in the flowsheet.   Outcome: Progressing Towards Goal  Note: Fall Risk Interventions:       Mentation Interventions: Bed/chair exit alarm    Medication Interventions: Patient to call before getting OOB, Teach patient to arise slowly                   Problem: Patient Education: Go to Patient Education Activity  Goal: Patient/Family Education  Outcome: Progressing Towards Goal

## 2020-10-27 NOTE — PROGRESS NOTES
Bedside and Verbal shift change report given to Sarah Beth RN/Tiki RN by [employer]. Report included information from the following: SBAR, Kardex, and MAR.

## 2020-10-27 NOTE — PROGRESS NOTES
Problem: Pain  Goal: *Control of Pain  Outcome: Progressing Towards Goal     Problem: Patient Education: Go to Patient Education Activity  Goal: Patient/Family Education  Outcome: Progressing Towards Goal     Problem: Falls - Risk of  Goal: *Absence of Falls  Description: Document José Antonio Going Fall Risk and appropriate interventions in the flowsheet.   Outcome: Progressing Towards Goal  Note: Fall Risk Interventions:     Medication Interventions: Teach patient to arise slowly, Patient to call before getting OOB    Problem: Patient Education: Go to Patient Education Activity  Goal: Patient/Family Education  Outcome: Progressing Towards Goal

## 2020-10-28 ENCOUNTER — APPOINTMENT (OUTPATIENT)
Dept: GENERAL RADIOLOGY | Age: 65
DRG: 336 | End: 2020-10-28
Attending: COLON & RECTAL SURGERY
Payer: MEDICARE

## 2020-10-28 ENCOUNTER — APPOINTMENT (OUTPATIENT)
Dept: CT IMAGING | Age: 65
DRG: 336 | End: 2020-10-28
Attending: COLON & RECTAL SURGERY
Payer: MEDICARE

## 2020-10-28 LAB
ANION GAP SERPL CALC-SCNC: 6 MMOL/L (ref 3–18)
BUN SERPL-MCNC: 10 MG/DL (ref 7–18)
BUN/CREAT SERPL: 14 (ref 12–20)
CALCIUM SERPL-MCNC: 9.5 MG/DL (ref 8.5–10.1)
CHLORIDE SERPL-SCNC: 105 MMOL/L (ref 100–111)
CO2 SERPL-SCNC: 29 MMOL/L (ref 21–32)
CREAT SERPL-MCNC: 0.7 MG/DL (ref 0.6–1.3)
ERYTHROCYTE [DISTWIDTH] IN BLOOD BY AUTOMATED COUNT: 12.6 % (ref 11.6–14.5)
GLUCOSE SERPL-MCNC: 84 MG/DL (ref 74–99)
HCT VFR BLD AUTO: 34.4 % (ref 35–45)
HGB BLD-MCNC: 11.6 G/DL (ref 12–16)
MAGNESIUM SERPL-MCNC: 2.2 MG/DL (ref 1.6–2.6)
MCH RBC QN AUTO: 30.1 PG (ref 24–34)
MCHC RBC AUTO-ENTMCNC: 33.7 G/DL (ref 31–37)
MCV RBC AUTO: 89.4 FL (ref 74–97)
PHOSPHATE SERPL-MCNC: 2.1 MG/DL (ref 2.5–4.9)
PLATELET # BLD AUTO: 205 K/UL (ref 135–420)
PMV BLD AUTO: 9.8 FL (ref 9.2–11.8)
POTASSIUM SERPL-SCNC: 3.4 MMOL/L (ref 3.5–5.5)
RBC # BLD AUTO: 3.85 M/UL (ref 4.2–5.3)
SODIUM SERPL-SCNC: 140 MMOL/L (ref 136–145)
WBC # BLD AUTO: 9.9 K/UL (ref 4.6–13.2)

## 2020-10-28 PROCEDURE — 2709999900 HC NON-CHARGEABLE SUPPLY

## 2020-10-28 PROCEDURE — 85027 COMPLETE CBC AUTOMATED: CPT

## 2020-10-28 PROCEDURE — 84100 ASSAY OF PHOSPHORUS: CPT

## 2020-10-28 PROCEDURE — 65270000029 HC RM PRIVATE

## 2020-10-28 PROCEDURE — 74176 CT ABD & PELVIS W/O CONTRAST: CPT

## 2020-10-28 PROCEDURE — 99232 SBSQ HOSP IP/OBS MODERATE 35: CPT | Performed by: COLON & RECTAL SURGERY

## 2020-10-28 PROCEDURE — 74018 RADEX ABDOMEN 1 VIEW: CPT

## 2020-10-28 PROCEDURE — 36415 COLL VENOUS BLD VENIPUNCTURE: CPT

## 2020-10-28 PROCEDURE — 80048 BASIC METABOLIC PNL TOTAL CA: CPT

## 2020-10-28 PROCEDURE — 74011250636 HC RX REV CODE- 250/636: Performed by: COLON & RECTAL SURGERY

## 2020-10-28 PROCEDURE — 74011000250 HC RX REV CODE- 250: Performed by: COLON & RECTAL SURGERY

## 2020-10-28 PROCEDURE — 83735 ASSAY OF MAGNESIUM: CPT

## 2020-10-28 PROCEDURE — 77030008771 HC TU NG SALEM SUMP -A

## 2020-10-28 RX ADMIN — MORPHINE SULFATE 2 MG: 2 INJECTION, SOLUTION INTRAMUSCULAR; INTRAVENOUS at 04:30

## 2020-10-28 RX ADMIN — MORPHINE SULFATE 2 MG: 2 INJECTION, SOLUTION INTRAMUSCULAR; INTRAVENOUS at 08:26

## 2020-10-28 RX ADMIN — MORPHINE SULFATE 2 MG: 2 INJECTION, SOLUTION INTRAMUSCULAR; INTRAVENOUS at 00:09

## 2020-10-28 RX ADMIN — FAMOTIDINE 20 MG: 10 INJECTION INTRAVENOUS at 08:26

## 2020-10-28 RX ADMIN — DIPHENHYDRAMINE HYDROCHLORIDE 25 MG: 50 INJECTION, SOLUTION INTRAMUSCULAR; INTRAVENOUS at 00:11

## 2020-10-28 RX ADMIN — HEPARIN SODIUM 5000 UNITS: 5000 INJECTION INTRAVENOUS; SUBCUTANEOUS at 06:05

## 2020-10-28 RX ADMIN — MORPHINE SULFATE 2 MG: 2 INJECTION, SOLUTION INTRAMUSCULAR; INTRAVENOUS at 03:24

## 2020-10-28 RX ADMIN — HEPARIN SODIUM 5000 UNITS: 5000 INJECTION INTRAVENOUS; SUBCUTANEOUS at 14:43

## 2020-10-28 NOTE — PROGRESS NOTES
Bedside and Verbal shift change report given to SYDNIE landis (oncoming nurse) by Leonardo Muñiz (offgoing nurse). Report included the following information SBAR and Kardex.

## 2020-10-28 NOTE — PROGRESS NOTES
[address] [facility] [address]  [phone]  Colon and Rectal Surgery Progress Note      Patient: Mendel Neumann MRN: [id number]  SSN: [id number]    YOB: 1955  Age: 71 y.o. Sex: female      Admit Date: 10/26/2020    LOS: 2 days     Subjective:     Pain significantly improved, but not resolved. No bowel function. Objective:     Vitals:    10/28/20 0416 10/28/20 0814 10/28/20 1223 10/28/20 1640   BP: 129/71 (!) 140/75 118/76 131/78   Pulse: 66 62 70 81   Resp: 18 18 18 18   Temp: 99.9 °F (37.7 °C) 98 °F (36.7 °C) 97.6 °F (36.4 °C) 97.4 °F (36.3 °C)   SpO2: 96% 98% 98% 99%   Weight:       Height:            Intake and Output:  Current Shift: No intake/output data recorded.   Last three shifts: 10/26 1901 - 10/28 0700  In: 2231.3 [I.V.:2231.3]  Out: 1500 [Urine:1300]    Physical Exam:     Constitutional: well developed, in NAD  Abdomen: soft, mildly tender R and LLQ, ND, no guarding    Lab/Data Review:    CMP:   Lab Results   Component Value Date/Time     10/28/2020 03:30 AM    K 3.4 (L) 10/28/2020 03:30 AM     10/28/2020 03:30 AM    CO2 29 10/28/2020 03:30 AM    AGAP 6 10/28/2020 03:30 AM    GLU 84 10/28/2020 03:30 AM    BUN 10 10/28/2020 03:30 AM    CREA 0.70 10/28/2020 03:30 AM    GFRAA >60 10/28/2020 03:30 AM    GFRNA >60 10/28/2020 03:30 AM    CA 9.5 10/28/2020 03:30 AM    MG 2.2 10/28/2020 03:30 AM    PHOS 2.1 (L) 10/28/2020 03:30 AM     CBC:   Lab Results   Component Value Date/Time    WBC 9.9 10/28/2020 03:30 AM    HGB 11.6 (L) 10/28/2020 03:30 AM    HCT 34.4 (L) 10/28/2020 03:30 AM     10/28/2020 03:30 AM        Assessment:     SBO    Plan:     Continue NPO, NG, IVF  Repeat CT  May require ex lap    Signed By: Quoc Daugherty MD        October 28, 2020

## 2020-10-28 NOTE — PROGRESS NOTES
conducted an initial consultation and Spiritual Assessment for [facility], who is a 71 y.o.,female. Patient's Primary Language is: [place]. According to the patient's EMR Sabianism Affiliation is: Kandy Muller.     The reason the Patient came to the hospital is:   Patient Active Problem List    Diagnosis Date Noted    SBO (small bowel obstruction) ([facility] [address] 10/26/2020        The  provided the following Interventions:  Initiated a relationship of care and support. Explored issues of lucas, belief  Listened empathetically. Provided chaplaincy education. Offered assurance of continued prayers on patient's behalf. Chart reviewed. The following outcomes where achieved:  Patient shared limited information about both their medical narrative and spiritual beliefs.  confirmed Patient's Sabianism Affiliation as Latter day  Patient processed feeling about current hospitalization. Patient expressed gratitude for 's visit. Assessment:  Patient does not have any known Tenriism/cultural needs that will affect patient's preferences in health care. There are no known spiritual or Tenriism issues which require intervention at this time. Plan:  Chaplains will continue to follow and will provide pastoral care on as needed and as requested.  recommends bedside caregivers page the  on duty if patient shows signs of spiritual or emotional distress. [address]  [address]   [phone]

## 2020-10-28 NOTE — PROGRESS NOTES
Patient wandering in hallways NGT out IV out. Directed back to room and patient answered all orientation correctly. Helped patient back to bed. Will continue to monitor. Call bell in place.

## 2020-10-28 NOTE — PROGRESS NOTES
Bedside and Verbal shift change report given to [address] [place] [address] by [employer]. Report included information from the following: SBAR, Kardex, and MAR.

## 2020-10-29 ENCOUNTER — ANESTHESIA EVENT (OUTPATIENT)
Dept: SURGERY | Age: 65
DRG: 336 | End: 2020-10-29
Payer: MEDICARE

## 2020-10-29 ENCOUNTER — ANESTHESIA (OUTPATIENT)
Dept: SURGERY | Age: 65
DRG: 336 | End: 2020-10-29
Payer: MEDICARE

## 2020-10-29 LAB
ANION GAP SERPL CALC-SCNC: 10 MMOL/L (ref 3–18)
BUN SERPL-MCNC: 16 MG/DL (ref 7–18)
BUN/CREAT SERPL: 23 (ref 12–20)
CALCIUM SERPL-MCNC: 9.3 MG/DL (ref 8.5–10.1)
CHLORIDE SERPL-SCNC: 105 MMOL/L (ref 100–111)
CO2 SERPL-SCNC: 29 MMOL/L (ref 21–32)
CREAT SERPL-MCNC: 0.71 MG/DL (ref 0.6–1.3)
ERYTHROCYTE [DISTWIDTH] IN BLOOD BY AUTOMATED COUNT: 12.6 % (ref 11.6–14.5)
GLUCOSE SERPL-MCNC: 76 MG/DL (ref 74–99)
HCT VFR BLD AUTO: 36.5 % (ref 35–45)
HGB BLD-MCNC: 12.2 G/DL (ref 12–16)
MAGNESIUM SERPL-MCNC: 2.4 MG/DL (ref 1.6–2.6)
MCH RBC QN AUTO: 30.4 PG (ref 24–34)
MCHC RBC AUTO-ENTMCNC: 33.4 G/DL (ref 31–37)
MCV RBC AUTO: 91 FL (ref 74–97)
PHOSPHATE SERPL-MCNC: 4.6 MG/DL (ref 2.5–4.9)
PLATELET # BLD AUTO: 232 K/UL (ref 135–420)
PMV BLD AUTO: 10.3 FL (ref 9.2–11.8)
POTASSIUM SERPL-SCNC: 3.2 MMOL/L (ref 3.5–5.5)
RBC # BLD AUTO: 4.01 M/UL (ref 4.2–5.3)
SODIUM SERPL-SCNC: 144 MMOL/L (ref 136–145)
WBC # BLD AUTO: 8.9 K/UL (ref 4.6–13.2)

## 2020-10-29 PROCEDURE — 77030011264 HC ELECTRD BLD EXT COVD -A: Performed by: COLON & RECTAL SURGERY

## 2020-10-29 PROCEDURE — 77030040922 HC BLNKT HYPOTHRM STRY -A: Performed by: COLON & RECTAL SURGERY

## 2020-10-29 PROCEDURE — 74011250636 HC RX REV CODE- 250/636

## 2020-10-29 PROCEDURE — 44005 FREEING OF BOWEL ADHESION: CPT | Performed by: COLON & RECTAL SURGERY

## 2020-10-29 PROCEDURE — P9047 ALBUMIN (HUMAN), 25%, 50ML: HCPCS

## 2020-10-29 PROCEDURE — 00790 ANES IPER UPR ABD NOS: CPT | Performed by: NURSE ANESTHETIST, CERTIFIED REGISTERED

## 2020-10-29 PROCEDURE — 77030011278 HC ELECTRD LIG IMPT COVD -F: Performed by: COLON & RECTAL SURGERY

## 2020-10-29 PROCEDURE — P9045 ALBUMIN (HUMAN), 5%, 250 ML: HCPCS | Performed by: NURSE ANESTHETIST, CERTIFIED REGISTERED

## 2020-10-29 PROCEDURE — 77030002966 HC SUT PDS J&J -A: Performed by: COLON & RECTAL SURGERY

## 2020-10-29 PROCEDURE — 0DNW0ZZ RELEASE PERITONEUM, OPEN APPROACH: ICD-10-PCS | Performed by: COLON & RECTAL SURGERY

## 2020-10-29 PROCEDURE — 99232 SBSQ HOSP IP/OBS MODERATE 35: CPT | Performed by: COLON & RECTAL SURGERY

## 2020-10-29 PROCEDURE — 74011250636 HC RX REV CODE- 250/636: Performed by: COLON & RECTAL SURGERY

## 2020-10-29 PROCEDURE — 83735 ASSAY OF MAGNESIUM: CPT

## 2020-10-29 PROCEDURE — 77030040361 HC SLV COMPR DVT MDII -B: Performed by: COLON & RECTAL SURGERY

## 2020-10-29 PROCEDURE — 2709999900 HC NON-CHARGEABLE SUPPLY: Performed by: COLON & RECTAL SURGERY

## 2020-10-29 PROCEDURE — 77030031139 HC SUT VCRL2 J&J -A: Performed by: COLON & RECTAL SURGERY

## 2020-10-29 PROCEDURE — 74011000258 HC RX REV CODE- 258: Performed by: NURSE ANESTHETIST, CERTIFIED REGISTERED

## 2020-10-29 PROCEDURE — 77030003029 HC SUT VCRL J&J -B: Performed by: COLON & RECTAL SURGERY

## 2020-10-29 PROCEDURE — 74011000250 HC RX REV CODE- 250: Performed by: COLON & RECTAL SURGERY

## 2020-10-29 PROCEDURE — 00790 ANES IPER UPR ABD NOS: CPT | Performed by: ANESTHESIOLOGY

## 2020-10-29 PROCEDURE — 76210000016 HC OR PH I REC 1 TO 1.5 HR: Performed by: COLON & RECTAL SURGERY

## 2020-10-29 PROCEDURE — 77030003028 HC SUT VCRL J&J -A: Performed by: COLON & RECTAL SURGERY

## 2020-10-29 PROCEDURE — 84100 ASSAY OF PHOSPHORUS: CPT

## 2020-10-29 PROCEDURE — 76060000035 HC ANESTHESIA 2 TO 2.5 HR: Performed by: COLON & RECTAL SURGERY

## 2020-10-29 PROCEDURE — 85027 COMPLETE CBC AUTOMATED: CPT

## 2020-10-29 PROCEDURE — 76010000131 HC OR TIME 2 TO 2.5 HR: Performed by: COLON & RECTAL SURGERY

## 2020-10-29 PROCEDURE — 36415 COLL VENOUS BLD VENIPUNCTURE: CPT

## 2020-10-29 PROCEDURE — C1765 ADHESION BARRIER: HCPCS | Performed by: COLON & RECTAL SURGERY

## 2020-10-29 PROCEDURE — 74011000250 HC RX REV CODE- 250: Performed by: NURSE ANESTHETIST, CERTIFIED REGISTERED

## 2020-10-29 PROCEDURE — 80048 BASIC METABOLIC PNL TOTAL CA: CPT

## 2020-10-29 PROCEDURE — 77030008462 HC STPLR SKN PROX J&J -A: Performed by: COLON & RECTAL SURGERY

## 2020-10-29 PROCEDURE — 2709999900 HC NON-CHARGEABLE SUPPLY

## 2020-10-29 PROCEDURE — 65270000029 HC RM PRIVATE

## 2020-10-29 PROCEDURE — 74011250636 HC RX REV CODE- 250/636: Performed by: NURSE ANESTHETIST, CERTIFIED REGISTERED

## 2020-10-29 PROCEDURE — 77010033678 HC OXYGEN DAILY

## 2020-10-29 DEVICE — BARRIER TISS ABSRB 5X6IN 1/PK -- DIRECT ORDER ONLY NON MEDLINE: Type: IMPLANTABLE DEVICE | Site: ABDOMEN | Status: FUNCTIONAL

## 2020-10-29 RX ORDER — SODIUM CHLORIDE, SODIUM LACTATE, POTASSIUM CHLORIDE, CALCIUM CHLORIDE 600; 310; 30; 20 MG/100ML; MG/100ML; MG/100ML; MG/100ML
INJECTION, SOLUTION INTRAVENOUS
Status: DISCONTINUED | OUTPATIENT
Start: 2020-10-29 | End: 2020-10-29 | Stop reason: HOSPADM

## 2020-10-29 RX ORDER — HYDROMORPHONE HYDROCHLORIDE 2 MG/ML
0.2 INJECTION, SOLUTION INTRAMUSCULAR; INTRAVENOUS; SUBCUTANEOUS AS NEEDED
Status: DISCONTINUED | OUTPATIENT
Start: 2020-10-29 | End: 2020-10-29 | Stop reason: HOSPADM

## 2020-10-29 RX ORDER — METRONIDAZOLE 500 MG/100ML
500 INJECTION, SOLUTION INTRAVENOUS
Status: COMPLETED | OUTPATIENT
Start: 2020-10-29 | End: 2020-10-29

## 2020-10-29 RX ORDER — MIDAZOLAM HYDROCHLORIDE 1 MG/ML
INJECTION, SOLUTION INTRAMUSCULAR; INTRAVENOUS AS NEEDED
Status: DISCONTINUED | OUTPATIENT
Start: 2020-10-29 | End: 2020-10-29 | Stop reason: HOSPADM

## 2020-10-29 RX ORDER — MAGNESIUM SULFATE 100 %
4 CRYSTALS MISCELLANEOUS AS NEEDED
Status: DISCONTINUED | OUTPATIENT
Start: 2020-10-29 | End: 2020-10-29 | Stop reason: HOSPADM

## 2020-10-29 RX ORDER — DIPHENHYDRAMINE HYDROCHLORIDE 50 MG/ML
12.5 INJECTION, SOLUTION INTRAMUSCULAR; INTRAVENOUS
Status: DISCONTINUED | OUTPATIENT
Start: 2020-10-29 | End: 2020-10-29 | Stop reason: HOSPADM

## 2020-10-29 RX ORDER — ALBUTEROL SULFATE 0.83 MG/ML
2.5 SOLUTION RESPIRATORY (INHALATION)
Status: DISCONTINUED | OUTPATIENT
Start: 2020-10-29 | End: 2020-10-29 | Stop reason: HOSPADM

## 2020-10-29 RX ORDER — DEXAMETHASONE SODIUM PHOSPHATE 4 MG/ML
INJECTION, SOLUTION INTRA-ARTICULAR; INTRALESIONAL; INTRAMUSCULAR; INTRAVENOUS; SOFT TISSUE AS NEEDED
Status: DISCONTINUED | OUTPATIENT
Start: 2020-10-29 | End: 2020-10-29 | Stop reason: HOSPADM

## 2020-10-29 RX ORDER — GLYCOPYRROLATE 0.2 MG/ML
INJECTION INTRAMUSCULAR; INTRAVENOUS AS NEEDED
Status: DISCONTINUED | OUTPATIENT
Start: 2020-10-29 | End: 2020-10-29 | Stop reason: HOSPADM

## 2020-10-29 RX ORDER — CEFAZOLIN SODIUM 2 G/50ML
2 SOLUTION INTRAVENOUS
Status: COMPLETED | OUTPATIENT
Start: 2020-10-29 | End: 2020-10-29

## 2020-10-29 RX ORDER — PROPOFOL 10 MG/ML
INJECTION, EMULSION INTRAVENOUS AS NEEDED
Status: DISCONTINUED | OUTPATIENT
Start: 2020-10-29 | End: 2020-10-29 | Stop reason: HOSPADM

## 2020-10-29 RX ORDER — SUCCINYLCHOLINE CHLORIDE 20 MG/ML
INJECTION INTRAMUSCULAR; INTRAVENOUS AS NEEDED
Status: DISCONTINUED | OUTPATIENT
Start: 2020-10-29 | End: 2020-10-29 | Stop reason: HOSPADM

## 2020-10-29 RX ORDER — SODIUM CHLORIDE 0.9 % (FLUSH) 0.9 %
5-40 SYRINGE (ML) INJECTION EVERY 8 HOURS
Status: DISCONTINUED | OUTPATIENT
Start: 2020-10-29 | End: 2020-10-29 | Stop reason: HOSPADM

## 2020-10-29 RX ORDER — SODIUM CHLORIDE 0.9 % (FLUSH) 0.9 %
5-40 SYRINGE (ML) INJECTION AS NEEDED
Status: DISCONTINUED | OUTPATIENT
Start: 2020-10-29 | End: 2020-10-29 | Stop reason: HOSPADM

## 2020-10-29 RX ORDER — NEOSTIGMINE METHYLSULFATE 1 MG/ML
INJECTION, SOLUTION INTRAVENOUS AS NEEDED
Status: DISCONTINUED | OUTPATIENT
Start: 2020-10-29 | End: 2020-10-29 | Stop reason: HOSPADM

## 2020-10-29 RX ORDER — LIDOCAINE HYDROCHLORIDE 20 MG/ML
INJECTION, SOLUTION EPIDURAL; INFILTRATION; INTRACAUDAL; PERINEURAL AS NEEDED
Status: DISCONTINUED | OUTPATIENT
Start: 2020-10-29 | End: 2020-10-29 | Stop reason: HOSPADM

## 2020-10-29 RX ORDER — DEXTROSE 50 % IN WATER (D50W) INTRAVENOUS SYRINGE
25-50 AS NEEDED
Status: DISCONTINUED | OUTPATIENT
Start: 2020-10-29 | End: 2020-10-29 | Stop reason: HOSPADM

## 2020-10-29 RX ORDER — ALBUMIN HUMAN 250 G/1000ML
SOLUTION INTRAVENOUS AS NEEDED
Status: DISCONTINUED | OUTPATIENT
Start: 2020-10-29 | End: 2020-10-29 | Stop reason: HOSPADM

## 2020-10-29 RX ORDER — ALBUMIN HUMAN 50 G/1000ML
SOLUTION INTRAVENOUS AS NEEDED
Status: DISCONTINUED | OUTPATIENT
Start: 2020-10-29 | End: 2020-10-29 | Stop reason: HOSPADM

## 2020-10-29 RX ORDER — SODIUM CHLORIDE 9 MG/ML
INJECTION, SOLUTION INTRAVENOUS
Status: DISCONTINUED | OUTPATIENT
Start: 2020-10-29 | End: 2020-10-29 | Stop reason: HOSPADM

## 2020-10-29 RX ORDER — ONDANSETRON 2 MG/ML
4 INJECTION INTRAMUSCULAR; INTRAVENOUS ONCE
Status: COMPLETED | OUTPATIENT
Start: 2020-10-29 | End: 2020-10-29

## 2020-10-29 RX ORDER — HYDROMORPHONE HYDROCHLORIDE 2 MG/ML
0.5 INJECTION, SOLUTION INTRAMUSCULAR; INTRAVENOUS; SUBCUTANEOUS
Status: DISCONTINUED | OUTPATIENT
Start: 2020-10-29 | End: 2020-10-29 | Stop reason: HOSPADM

## 2020-10-29 RX ORDER — ROCURONIUM BROMIDE 10 MG/ML
INJECTION, SOLUTION INTRAVENOUS AS NEEDED
Status: DISCONTINUED | OUTPATIENT
Start: 2020-10-29 | End: 2020-10-29 | Stop reason: HOSPADM

## 2020-10-29 RX ORDER — DEXMEDETOMIDINE HYDROCHLORIDE 4 UG/ML
INJECTION, SOLUTION INTRAVENOUS AS NEEDED
Status: DISCONTINUED | OUTPATIENT
Start: 2020-10-29 | End: 2020-10-29 | Stop reason: HOSPADM

## 2020-10-29 RX ORDER — FENTANYL CITRATE 50 UG/ML
INJECTION, SOLUTION INTRAMUSCULAR; INTRAVENOUS AS NEEDED
Status: DISCONTINUED | OUTPATIENT
Start: 2020-10-29 | End: 2020-10-29 | Stop reason: HOSPADM

## 2020-10-29 RX ORDER — HYDROCODONE BITARTRATE AND ACETAMINOPHEN 5; 325 MG/1; MG/1
1 TABLET ORAL AS NEEDED
Status: DISCONTINUED | OUTPATIENT
Start: 2020-10-29 | End: 2020-10-29 | Stop reason: HOSPADM

## 2020-10-29 RX ADMIN — SODIUM CHLORIDE, SODIUM LACTATE, POTASSIUM CHLORIDE, AND CALCIUM CHLORIDE 125 ML/HR: 600; 310; 30; 20 INJECTION, SOLUTION INTRAVENOUS at 20:04

## 2020-10-29 RX ADMIN — HYDROMORPHONE HYDROCHLORIDE 0.5 MG: 2 INJECTION INTRAMUSCULAR; INTRAVENOUS; SUBCUTANEOUS at 10:19

## 2020-10-29 RX ADMIN — CEFAZOLIN SODIUM 2 G: 2 SOLUTION INTRAVENOUS at 08:22

## 2020-10-29 RX ADMIN — ALBUMIN (HUMAN) 250 ML: 12.5 INJECTION, SOLUTION INTRAVENOUS at 09:07

## 2020-10-29 RX ADMIN — ROCURONIUM BROMIDE 25 MG: 50 INJECTION INTRAVENOUS at 08:16

## 2020-10-29 RX ADMIN — DEXMEDETOMIDINE HYDROCHLORIDE 8 MCG: 4 INJECTION, SOLUTION INTRAVENOUS at 09:47

## 2020-10-29 RX ADMIN — SUCCINYLCHOLINE CHLORIDE 100 MG: 20 INJECTION, SOLUTION INTRAMUSCULAR; INTRAVENOUS at 08:13

## 2020-10-29 RX ADMIN — FAMOTIDINE 20 MG: 10 INJECTION INTRAVENOUS at 00:31

## 2020-10-29 RX ADMIN — ALBUMIN HUMAN 100 ML: 250 SOLUTION INTRAVENOUS at 08:22

## 2020-10-29 RX ADMIN — LIDOCAINE HYDROCHLORIDE 60 MG: 20 INJECTION, SOLUTION EPIDURAL; INFILTRATION; INTRACAUDAL; PERINEURAL at 08:13

## 2020-10-29 RX ADMIN — FENTANYL CITRATE 50 MCG: 50 INJECTION, SOLUTION INTRAMUSCULAR; INTRAVENOUS at 08:13

## 2020-10-29 RX ADMIN — MIDAZOLAM HYDROCHLORIDE 2 MG: 2 INJECTION, SOLUTION INTRAMUSCULAR; INTRAVENOUS at 07:59

## 2020-10-29 RX ADMIN — PHENYLEPHRINE HYDROCHLORIDE 50 MCG: 10 INJECTION INTRAVENOUS at 08:16

## 2020-10-29 RX ADMIN — SODIUM CHLORIDE, SODIUM LACTATE, POTASSIUM CHLORIDE, AND CALCIUM CHLORIDE: 600; 310; 30; 20 INJECTION, SOLUTION INTRAVENOUS at 07:59

## 2020-10-29 RX ADMIN — SODIUM CHLORIDE, SODIUM LACTATE, POTASSIUM CHLORIDE, AND CALCIUM CHLORIDE 125 ML/HR: 600; 310; 30; 20 INJECTION, SOLUTION INTRAVENOUS at 04:07

## 2020-10-29 RX ADMIN — HEPARIN SODIUM 5000 UNITS: 5000 INJECTION INTRAVENOUS; SUBCUTANEOUS at 00:31

## 2020-10-29 RX ADMIN — SODIUM CHLORIDE, SODIUM LACTATE, POTASSIUM CHLORIDE, AND CALCIUM CHLORIDE 125 ML/HR: 600; 310; 30; 20 INJECTION, SOLUTION INTRAVENOUS at 11:46

## 2020-10-29 RX ADMIN — ONDANSETRON 4 MG: 2 INJECTION INTRAMUSCULAR; INTRAVENOUS at 10:24

## 2020-10-29 RX ADMIN — PROPOFOL 50 MG: 10 INJECTION, EMULSION INTRAVENOUS at 09:33

## 2020-10-29 RX ADMIN — LIDOCAINE HYDROCHLORIDE 40 MG: 20 INJECTION, SOLUTION EPIDURAL; INFILTRATION; INTRACAUDAL; PERINEURAL at 09:26

## 2020-10-29 RX ADMIN — DEXMEDETOMIDINE HYDROCHLORIDE 4 MCG: 4 INJECTION, SOLUTION INTRAVENOUS at 08:51

## 2020-10-29 RX ADMIN — DEXMEDETOMIDINE HYDROCHLORIDE 4 MCG: 4 INJECTION, SOLUTION INTRAVENOUS at 09:25

## 2020-10-29 RX ADMIN — MORPHINE SULFATE 2 MG: 2 INJECTION, SOLUTION INTRAMUSCULAR; INTRAVENOUS at 20:00

## 2020-10-29 RX ADMIN — MORPHINE SULFATE 2 MG: 2 INJECTION, SOLUTION INTRAMUSCULAR; INTRAVENOUS at 00:31

## 2020-10-29 RX ADMIN — Medication 5 MG: at 09:50

## 2020-10-29 RX ADMIN — SODIUM CHLORIDE: 900 INJECTION, SOLUTION INTRAVENOUS at 08:16

## 2020-10-29 RX ADMIN — DEXAMETHASONE SODIUM PHOSPHATE 4 MG: 4 INJECTION, SOLUTION INTRAMUSCULAR; INTRAVENOUS at 08:16

## 2020-10-29 RX ADMIN — PHENYLEPHRINE HYDROCHLORIDE 50 MCG: 10 INJECTION INTRAVENOUS at 08:20

## 2020-10-29 RX ADMIN — FAMOTIDINE 20 MG: 10 INJECTION INTRAVENOUS at 20:00

## 2020-10-29 RX ADMIN — ROCURONIUM BROMIDE 5 MG: 50 INJECTION INTRAVENOUS at 08:13

## 2020-10-29 RX ADMIN — HYDROMORPHONE HYDROCHLORIDE 0.5 MG: 2 INJECTION INTRAMUSCULAR; INTRAVENOUS; SUBCUTANEOUS at 10:29

## 2020-10-29 RX ADMIN — FENTANYL CITRATE 50 MCG: 50 INJECTION, SOLUTION INTRAMUSCULAR; INTRAVENOUS at 07:59

## 2020-10-29 RX ADMIN — DEXMEDETOMIDINE HYDROCHLORIDE 4 MCG: 4 INJECTION, SOLUTION INTRAVENOUS at 09:33

## 2020-10-29 RX ADMIN — METRONIDAZOLE 500 MG: 500 INJECTION, SOLUTION INTRAVENOUS at 08:29

## 2020-10-29 RX ADMIN — HEPARIN SODIUM 5000 UNITS: 5000 INJECTION INTRAVENOUS; SUBCUTANEOUS at 06:25

## 2020-10-29 RX ADMIN — GLYCOPYRROLATE 0.8 MG: 0.2 INJECTION INTRAMUSCULAR; INTRAVENOUS at 09:50

## 2020-10-29 RX ADMIN — PROPOFOL 150 MG: 10 INJECTION, EMULSION INTRAVENOUS at 08:13

## 2020-10-29 RX ADMIN — HEPARIN SODIUM 5000 UNITS: 5000 INJECTION INTRAVENOUS; SUBCUTANEOUS at 16:05

## 2020-10-29 NOTE — PROGRESS NOTES
TRANSFER - IN REPORT:    Verbal report received from [facility] RN(name) on [facility]  being received from [address] [place] [address](unit) for ordered procedure      Report consisted of patients Situation, Background, Assessment and   Recommendations(SBAR). Information from the following report(s) SBAR, Kardex, [facility] and Recent Results was reviewed with the receiving nurse. Opportunity for questions and clarification was provided. Assessment completed upon patients arrival to unit and care assumed.

## 2020-10-29 NOTE — PROGRESS NOTES
Bedside and Verbal shift change report given to Dre Key RN (oncoming nurse) by Mick Vieira RN (offgoing nurse). Report included the following information SBAR and Kardex.

## 2020-10-29 NOTE — PROGRESS NOTES
Aox3, no apparent distress, had small BM today, hard stool, NGT intact and draining. Call bell with reach, bed alarm on, and bed in low position.

## 2020-10-29 NOTE — ANESTHESIA POSTPROCEDURE EVALUATION
Procedure(s):  LAPAROTOMY EXPLORATORY/LYSIS OF ADHESIONS. general    Anesthesia Post Evaluation      Multimodal analgesia: multimodal analgesia used between 6 hours prior to anesthesia start to PACU discharge  Patient location during evaluation: PACU  Patient participation: complete - patient participated  Level of consciousness: awake  Pain management: adequate  Airway patency: patent  Anesthetic complications: no  Cardiovascular status: acceptable  Respiratory status: acceptable  Hydration status: acceptable  Post anesthesia nausea and vomiting:  controlled      INITIAL Post-op Vital signs:   Vitals Value Taken Time   /58 10/29/2020 10:58 AM   Temp 36.3 °C (97.4 °F) 10/29/2020 10:48 AM   Pulse 63 10/29/2020 11:05 AM   Resp 13 10/29/2020 11:05 AM   SpO2 100 % 10/29/2020 11:06 AM   Vitals shown include unvalidated device data.

## 2020-10-29 NOTE — PROGRESS NOTES
[facility]  [address] [facility] [address]  [phone]  Colon and Rectal Surgery Progress Note      Patient: Rosemary Knapp MRN: [id number]  SSN: [id number]    YOB: 1955  Age: 71 y.o. Sex: female      Admit Date: 10/26/2020    LOS: 3 days     Subjective:     Lower abdominal pain unchanged. CT overnight. No bowel function. Objective:     Vitals:    10/28/20 2135 10/29/20 0002 10/29/20 0331 10/29/20 0721   BP: 136/80 (!) 142/88 (!) 147/92 (!) 156/84   Pulse: 89 91 89 86   Resp: 16 18 16 20   Temp: 99.6 °F (37.6 °C) 97 °F (36.1 °C) 97.2 °F (36.2 °C) 98.4 °F (36.9 °C)   SpO2: 98% 97% 98% 99%   Weight:       Height:            Intake and Output:  Current Shift: No intake/output data recorded.   Last three shifts: 10/27 1901 - 10/29 0700  In: 3985.8 [I.V.:3965.8]  Out: 1400 [Urine:900]    Physical Exam:     Constitutional: well developed, in NAD  Abdomen: soft, mildly tender R and LLQ, ND, no guarding    Lab/Data Review:    CMP:   Lab Results   Component Value Date/Time     10/29/2020 01:10 AM    K 3.2 (L) 10/29/2020 01:10 AM     10/29/2020 01:10 AM    CO2 29 10/29/2020 01:10 AM    AGAP 10 10/29/2020 01:10 AM    GLU 76 10/29/2020 01:10 AM    BUN 16 10/29/2020 01:10 AM    CREA 0.71 10/29/2020 01:10 AM    GFRAA >60 10/29/2020 01:10 AM    GFRNA >60 10/29/2020 01:10 AM    CA 9.3 10/29/2020 01:10 AM    MG 2.4 10/29/2020 01:10 AM    PHOS 4.6 10/29/2020 01:10 AM     CBC:   Lab Results   Component Value Date/Time    WBC 8.9 10/29/2020 01:10 AM    HGB 12.2 10/29/2020 01:10 AM    HCT 36.5 10/29/2020 01:10 AM     10/29/2020 01:10 AM        CT abd/pelvis personally visualized; persistent SBO, possible closed loop, ascites    Assessment:     SBO, worsening    Plan:     I have told the patient her CT looks worse and further conservative measures not likely to succeed  OR for ex lap      Signed By: Kate Frost MD        October 29, 2020

## 2020-10-29 NOTE — PROGRESS NOTES
Bedside and Verbal shift change report given to [place] Marcus.SYDNIE (oncoming nurse) by Mari Gray RN (offgoing nurse). Report included the following information SBAR, Kardex, Intake/Output and MAR.

## 2020-10-29 NOTE — ANESTHESIA PREPROCEDURE EVALUATION
Relevant Problems   No relevant active problems       Anesthetic History   No history of anesthetic complications            Review of Systems / Medical History  Patient summary reviewed and pertinent labs reviewed    Pulmonary                   Neuro/Psych   Within defined limits          Comments: EtOH Abuse Cardiovascular    Hypertension: well controlled                   GI/Hepatic/Renal               Comments:  Bowel obstruction Endo/Other  Within defined limits           Other Findings   Comments: EtOH abuse until few weeks ago           Physical Exam    Airway  Mallampati: II  TM Distance: 4 - 6 cm  Neck ROM: normal range of motion        Cardiovascular    Rhythm: regular  Rate: normal         Dental    Dentition: Poor dentition     Pulmonary                Comments: Non labored Abdominal  GI exam deferred       Other Findings            Anesthetic Plan    ASA: 3  Anesthesia type: general          Induction: Intravenous  Anesthetic plan and risks discussed with: Patient      Accepts blood if needed

## 2020-10-29 NOTE — PROGRESS NOTES
Received phone call from Dr. Aretha Arthur that the patient is going to the surgery this morning. Notified that heparin SQ was just given to the patient. MD is aware and still wanting to proceed the surgery. No new order received.

## 2020-10-29 NOTE — PERIOP NOTES
TRANSFER - OUT REPORT:    Verbal report given to Fallon GUO RN(name) on [facility]  being transferred to [address] [place] [address](unit) for routine post - op       Report consisted of patients Situation, Background, Assessment and   Recommendations(SBAR). Information from the following report(s) SBAR, OR Summary, Procedure Summary, Intake/Output and MAR was reviewed with the receiving nurse. Lines:   Peripheral IV 10/28/20 Right Forearm (Active)   Site Assessment Clean, dry, & intact 10/29/20 1008   Phlebitis Assessment 0 10/29/20 1008   Infiltration Assessment 0 10/29/20 1008   Dressing Status Clean, dry, & intact 10/29/20 1008   Dressing Type Transparent;Tape 10/29/20 1008   Hub Color/Line Status Blue;Capped 10/29/20 1008   Alcohol Cap Used Yes 10/28/20 1043       Peripheral IV 10/29/20 Left Hand (Active)   Site Assessment Clean, dry, & intact 10/29/20 1008   Phlebitis Assessment 0 10/29/20 1008   Infiltration Assessment 0 10/29/20 1008   Dressing Status Clean, dry, & intact 10/29/20 1008   Dressing Type Transparent;Tape 10/29/20 1008   Hub Color/Line Status Green; Infusing 10/29/20 1008        Opportunity for questions and clarification was provided.       Patient transported with:   O2 @ 3 liters  Tech

## 2020-10-29 NOTE — PROGRESS NOTES
TRANSFER - OUT REPORT:    Verbal report given to SYDNIE Yates(name) on [facility]  being transferred to Preop(unit) for ordered procedure       Report consisted of patients Situation, Background, Assessment and   Recommendations(SBAR). Information from the following report(s) SBAR, Kardex, Intake/Output and MAR was reviewed with the receiving nurse. Lines:   Peripheral IV 10/28/20 Right Forearm (Active)   Site Assessment Clean, dry, & intact 10/28/20 1959   Phlebitis Assessment 0 10/28/20 1959   Infiltration Assessment 0 10/28/20 1959   Dressing Status Clean, dry, & intact 10/28/20 1959   Dressing Type Transparent 10/28/20 1959   Hub Color/Line Status Blue; Infusing;Capped 10/28/20 1959   Alcohol Cap Used Yes 10/28/20 1043        Opportunity for questions and clarification was provided.       Patient transported with:   [employer]

## 2020-10-29 NOTE — PROGRESS NOTES
Problem: Pain  Goal: *Control of Pain  Outcome: Progressing Towards Goal     Problem: Patient Education: Go to Patient Education Activity  Goal: Patient/Family Education  Outcome: Progressing Towards Goal     Problem: Falls - Risk of  Goal: *Absence of Falls  Description: Document Kyle Leonard Fall Risk and appropriate interventions in the flowsheet.   Outcome: Progressing Towards Goal  Note: Fall Risk Interventions:       Mentation Interventions: Bed/chair exit alarm, More frequent rounding    Medication Interventions: Patient to call before getting OOB, Teach patient to arise slowly, Bed/chair exit alarm                   Problem: Patient Education: Go to Patient Education Activity  Goal: Patient/Family Education  Outcome: Progressing Towards Goal

## 2020-10-29 NOTE — BRIEF OP NOTE
Brief Postoperative Note    Patient: Radha Lim  YOB: 1955  MRN: [id number]    Date of Procedure: 10/29/2020     Pre-Op Diagnosis: dx Small Bowel Obstruction    Post-Op Diagnosis: Same as preoperative diagnosis. Procedure(s):  LAPAROTOMY EXPLORATORY/LYSIS OF ADHESIONS    Surgeon(s):  Rosalia Hammonds MD    Surgical Assistant: Surg Asst-1: Kezia London    Anesthesia: General     Estimated Blood Loss (mL): less than 118     Complications: None    Specimens: * No specimens in log *     Implants:   Implant Name Type Inv.  Item Serial No.  Lot No. LRB No. Used Action   BARRIER TISS ABSRB 5X6IN 1/PK -- DIRECT ORDER ONLY NON MEDLINE - [id number]  BARRIER TISS ABSRB 5X6IN 1/PK -- DIRECT ORDER ONLY NON MEDLINE  [employer] [id number] N/A 2 Implanted       Drains:   Nasogastric Tube 10/28/20 (Active)   Site Assessment Clean, dry, & intact 10/28/20 1959   Securement Device Tape 10/28/20 1959   G Port Status Intermittent Suction 10/28/20 1959   External Insertion Gabo (cms) 55 cms 10/28/20 1450   Action Taken Placement verified (comment) 10/28/20 1959   Drainage Description Clear 10/28/20 1959   Water Flush Volume (mL) 20 mL 10/28/20 1959   Drainage Chamber Level (ml) 300 ml 10/28/20 1959   Output (ml) 300 ml 10/28/20 1959       [REMOVED] Nasogastric Tube 10/26/20 (Removed)   Site Assessment Clean, dry, & intact 10/27/20 2038   Securement Device Tape 10/27/20 2038   G Port Status Intermittent Suction 10/27/20 2038   External Insertion Gabo (cms) 55 cms 10/26/20 1740   Action Taken Retaped 10/27/20 2038   Drainage Description Serous 10/27/20 2038   Drainage Chamber Level (ml) 200 ml 10/28/20 0004   Output (ml) 0 ml 10/28/20 0004       Findings: SBO from adhesions    [id number]    Electronically Signed by Cathryn Bumpers, MD on 10/29/2020 at 10:07 AM

## 2020-10-30 LAB
ANION GAP SERPL CALC-SCNC: 7 MMOL/L (ref 3–18)
BUN SERPL-MCNC: 18 MG/DL (ref 7–18)
BUN/CREAT SERPL: 25 (ref 12–20)
CALCIUM SERPL-MCNC: 8.5 MG/DL (ref 8.5–10.1)
CHLORIDE SERPL-SCNC: 110 MMOL/L (ref 100–111)
CO2 SERPL-SCNC: 27 MMOL/L (ref 21–32)
CREAT SERPL-MCNC: 0.72 MG/DL (ref 0.6–1.3)
ERYTHROCYTE [DISTWIDTH] IN BLOOD BY AUTOMATED COUNT: 12.6 % (ref 11.6–14.5)
GLUCOSE SERPL-MCNC: 96 MG/DL (ref 74–99)
HCT VFR BLD AUTO: 28.8 % (ref 35–45)
HGB BLD-MCNC: 9.6 G/DL (ref 12–16)
MAGNESIUM SERPL-MCNC: 2.3 MG/DL (ref 1.6–2.6)
MCH RBC QN AUTO: 29.9 PG (ref 24–34)
MCHC RBC AUTO-ENTMCNC: 33.3 G/DL (ref 31–37)
MCV RBC AUTO: 89.7 FL (ref 74–97)
PHOSPHATE SERPL-MCNC: 2.3 MG/DL (ref 2.5–4.9)
PLATELET # BLD AUTO: 193 K/UL (ref 135–420)
PMV BLD AUTO: 9.8 FL (ref 9.2–11.8)
POTASSIUM SERPL-SCNC: 3.1 MMOL/L (ref 3.5–5.5)
RBC # BLD AUTO: 3.21 M/UL (ref 4.2–5.3)
SODIUM SERPL-SCNC: 144 MMOL/L (ref 136–145)
WBC # BLD AUTO: 6.6 K/UL (ref 4.6–13.2)

## 2020-10-30 PROCEDURE — 80048 BASIC METABOLIC PNL TOTAL CA: CPT

## 2020-10-30 PROCEDURE — 74011250636 HC RX REV CODE- 250/636: Performed by: SURGERY

## 2020-10-30 PROCEDURE — 84100 ASSAY OF PHOSPHORUS: CPT

## 2020-10-30 PROCEDURE — 74011000250 HC RX REV CODE- 250: Performed by: COLON & RECTAL SURGERY

## 2020-10-30 PROCEDURE — 36415 COLL VENOUS BLD VENIPUNCTURE: CPT

## 2020-10-30 PROCEDURE — 65270000029 HC RM PRIVATE

## 2020-10-30 PROCEDURE — 83735 ASSAY OF MAGNESIUM: CPT

## 2020-10-30 PROCEDURE — 74011250636 HC RX REV CODE- 250/636: Performed by: COLON & RECTAL SURGERY

## 2020-10-30 PROCEDURE — 85027 COMPLETE CBC AUTOMATED: CPT

## 2020-10-30 PROCEDURE — 2709999900 HC NON-CHARGEABLE SUPPLY

## 2020-10-30 RX ORDER — POTASSIUM CHLORIDE 7.45 MG/ML
10 INJECTION INTRAVENOUS
Status: DISPENSED | OUTPATIENT
Start: 2020-10-30 | End: 2020-10-30

## 2020-10-30 RX ORDER — HYDRALAZINE HYDROCHLORIDE 20 MG/ML
5 INJECTION INTRAMUSCULAR; INTRAVENOUS ONCE
Status: ACTIVE | OUTPATIENT
Start: 2020-10-30 | End: 2020-10-30

## 2020-10-30 RX ORDER — LORAZEPAM 2 MG/ML
1 INJECTION INTRAMUSCULAR
Status: DISCONTINUED | OUTPATIENT
Start: 2020-10-30 | End: 2020-11-05 | Stop reason: HOSPADM

## 2020-10-30 RX ORDER — HALOPERIDOL 5 MG/ML
2.5 INJECTION INTRAMUSCULAR ONCE
Status: ACTIVE | OUTPATIENT
Start: 2020-10-30 | End: 2020-10-30

## 2020-10-30 RX ADMIN — HEPARIN SODIUM 5000 UNITS: 5000 INJECTION INTRAVENOUS; SUBCUTANEOUS at 21:56

## 2020-10-30 RX ADMIN — MORPHINE SULFATE 2 MG: 2 INJECTION, SOLUTION INTRAMUSCULAR; INTRAVENOUS at 08:55

## 2020-10-30 RX ADMIN — POTASSIUM CHLORIDE 10 MEQ: 7.46 INJECTION, SOLUTION INTRAVENOUS at 11:46

## 2020-10-30 RX ADMIN — HEPARIN SODIUM 5000 UNITS: 5000 INJECTION INTRAVENOUS; SUBCUTANEOUS at 14:34

## 2020-10-30 RX ADMIN — MORPHINE SULFATE 2 MG: 2 INJECTION, SOLUTION INTRAMUSCULAR; INTRAVENOUS at 04:33

## 2020-10-30 RX ADMIN — MORPHINE SULFATE 2 MG: 2 INJECTION, SOLUTION INTRAMUSCULAR; INTRAVENOUS at 19:59

## 2020-10-30 RX ADMIN — POTASSIUM CHLORIDE 10 MEQ: 7.46 INJECTION, SOLUTION INTRAVENOUS at 10:01

## 2020-10-30 RX ADMIN — FAMOTIDINE 20 MG: 10 INJECTION INTRAVENOUS at 08:55

## 2020-10-30 RX ADMIN — POTASSIUM CHLORIDE 10 MEQ: 7.46 INJECTION, SOLUTION INTRAVENOUS at 08:55

## 2020-10-30 RX ADMIN — HEPARIN SODIUM 5000 UNITS: 5000 INJECTION INTRAVENOUS; SUBCUTANEOUS at 06:08

## 2020-10-30 RX ADMIN — HEPARIN SODIUM 5000 UNITS: 5000 INJECTION INTRAVENOUS; SUBCUTANEOUS at 00:41

## 2020-10-30 RX ADMIN — LORAZEPAM 1 MG: 2 INJECTION INTRAMUSCULAR; INTRAVENOUS at 20:56

## 2020-10-30 RX ADMIN — FAMOTIDINE 20 MG: 10 INJECTION INTRAVENOUS at 20:56

## 2020-10-30 NOTE — PROGRESS NOTES
Problem: Pain  Goal: *Control of Pain  Outcome: Progressing Towards Goal     Problem: Patient Education: Go to Patient Education Activity  Goal: Patient/Family Education  Outcome: Progressing Towards Goal     Problem: Falls - Risk of  Goal: *Absence of Falls  Description: Document Nel Buckner Fall Risk and appropriate interventions in the flowsheet. Outcome: Progressing Towards Goal  Note: Fall Risk Interventions:  Mobility Interventions: Patient to call before getting OOB    Mentation Interventions: Door open when patient unattended    Medication Interventions: Patient to call before getting OOB, Teach patient to arise slowly, Bed/chair exit alarm    Elimination Interventions: Call light in reach              Problem: Patient Education: Go to Patient Education Activity  Goal: Patient/Family Education  Outcome: Progressing Towards Goal     Problem: Patient Education: Go to Patient Education Activity  Goal: Patient/Family Education  Outcome: Resolved/Met     Problem: Surgical Pathway Day of Surgery  Goal: Off Pathway (Use only if patient is Off Pathway)  Outcome: Resolved/Met  Goal: Activity/Safety  Outcome: Resolved/Met  Goal: Consults, if ordered  Outcome: Resolved/Met  Goal: Nutrition/Diet  Outcome: Resolved/Met  Goal: Medications  Outcome: Resolved/Met  Goal: Respiratory  Outcome: Resolved/Met  Goal: Treatments/Interventions/Procedures  Outcome: Resolved/Met  Goal: Psychosocial  Outcome: Resolved/Met  Goal: *No signs and symptoms of infection or wound complications  Outcome: Resolved/Met  Goal: *Optimal pain control at patient's stated goal  Outcome: Resolved/Met  Goal: *Adequate urinary output (equal to or greater than 30 milliliters/hour)  Description: Ambulatory Surgery patients voiding without difficulty.   Outcome: Resolved/Met  Goal: *Hemodynamically stable  Outcome: Resolved/Met  Goal: *Tolerating diet  Outcome: Resolved/Met  Goal: *Demonstrates progressive activity  Outcome: Resolved/Met     Problem: Surgical Pathway Day of Surgery  Goal: Off Pathway (Use only if patient is Off Pathway)  Outcome: Resolved/Met  Goal: Activity/Safety  Outcome: Resolved/Met  Goal: Consults, if ordered  Outcome: Resolved/Met  Goal: Nutrition/Diet  Outcome: Resolved/Met  Goal: Medications  Outcome: Resolved/Met  Goal: Respiratory  Outcome: Resolved/Met  Goal: Treatments/Interventions/Procedures  Outcome: Resolved/Met  Goal: Psychosocial  Outcome: Resolved/Met  Goal: *No signs and symptoms of infection or wound complications  Outcome: Resolved/Met  Goal: *Optimal pain control at patient's stated goal  Outcome: Resolved/Met  Goal: *Adequate urinary output (equal to or greater than 30 milliliters/hour)  Description: Ambulatory Surgery patients voiding without difficulty.   Outcome: Resolved/Met  Goal: *Hemodynamically stable  Outcome: Resolved/Met  Goal: *Tolerating diet  Outcome: Resolved/Met  Goal: *Demonstrates progressive activity  Outcome: Resolved/Met

## 2020-10-30 NOTE — PROGRESS NOTES
[address] [facility] [address]  [phone]  Colon and Rectal Surgery Progress Note      Patient: Nia Calderon MRN: [id number]  SSN: [id number]    YOB: 1955  Age: 71 y.o. Sex: female      Admit Date: 10/26/2020    LOS: 4 days     Subjective:     Pain controlled    Objective:     Vitals:    10/29/20 1959 10/30/20 0043 10/30/20 0347 10/30/20 0752   BP: 135/69 (!) 179/88 139/66 137/72   Pulse: (!) 59 (!) 109 76 84   Resp: 16 20 19 16   Temp: 99 °F (37.2 °C) 97.4 °F (36.3 °C) (!) 100.7 °F (38.2 °C) 99.8 °F (37.7 °C)   SpO2: 97% 100% 97% 97%   Weight:       Height:            Intake and Output:  Current Shift: No intake/output data recorded.   Last three shifts: 10/28 1901 - 10/30 0700  In: 5015 [I.V.:4495]  Out: 760 [Urine:250]    Physical Exam:     Constitutional: well developed, in NAD  Abdomen: soft, appr tender, ND    Lab/Data Review:    CMP:   Lab Results   Component Value Date/Time     10/30/2020 02:42 AM    K 3.1 (L) 10/30/2020 02:42 AM     10/30/2020 02:42 AM    CO2 27 10/30/2020 02:42 AM    AGAP 7 10/30/2020 02:42 AM    GLU 96 10/30/2020 02:42 AM    BUN 18 10/30/2020 02:42 AM    CREA 0.72 10/30/2020 02:42 AM    GFRAA >60 10/30/2020 02:42 AM    GFRNA >60 10/30/2020 02:42 AM    CA 8.5 10/30/2020 02:42 AM    MG 2.3 10/30/2020 02:42 AM    PHOS 2.3 (L) 10/30/2020 02:42 AM     CBC:   Lab Results   Component Value Date/Time    WBC 6.6 10/30/2020 02:42 AM    HGB 9.6 (L) 10/30/2020 02:42 AM    HCT 28.8 (L) 10/30/2020 02:42 AM     10/30/2020 02:42 AM        Assessment:     SBO, s/p ex lap PATRICIA    Plan:     NPO, IVF, NG  Await bowel function    Signed By: Christopher Benitez MD        October 30, 2020

## 2020-10-30 NOTE — ROUTINE PROCESS
Bedside shift change report given to Maribeth Wilson RN (oncoming nurse) by Jc Mcgowan RN (offgoing nurse). Report included the following information SBAR, Kardex, Procedure Summary, Intake/Output, MAR and Recent Results. Opportunity for questions and clarification was provided.

## 2020-10-30 NOTE — PROGRESS NOTES
Reason for Admission:  SBO (small bowel obstruction) ([facility] [address] [K56.609]                 RUR Score:    12%            Plan for utilizing home health:    no                      Likelihood of Readmission:   LOW                         Transition of Care Plan:              Initial assessment completed with patient. Cognitive status of patient: oriented to time, place, person and situation. Face sheet information confirmed:  yes. The patient designates spouse, John Hua to participate in her discharge plan and to receive any needed information. This patient lives in a single family home with spouse. Patient is able to navigate steps as needed. Prior to hospitalization, patient was considered to be independent with ADLs/IADLS : yes . Patient has a current ACP document on file: no  The spouse will be available to transport patient home upon discharge. The patient has no DME available in the home. Patient is not currently active with home health. Patient has not stayed in a skilled nursing facility or rehab. This patient is on dialysis :no    Currently, the discharge plan is Home. The patient states that she can obtain her medications from the pharmacy, and take her medications as directed. Patient's current insurance is [employer]       Care Management Interventions  PCP Verified by CM:  Yes  Mode of Transport at Discharge: Self  Transition of Care Consult (CM Consult): Discharge Planning  Current Support Network: Lives with Spouse  Confirm Follow Up Transport: Family  Discharge Location  Discharge Placement: Home        Bisi Anaya RN - Outcomes Manager  [phone]

## 2020-10-30 NOTE — PROGRESS NOTES
Problem: Pain  Goal: *Control of Pain  Outcome: Progressing Towards Goal     Problem: Falls - Risk of  Goal: *Absence of Falls  Description: Document Jorge Ablerto Fall Risk and appropriate interventions in the flowsheet.   Outcome: Not Progressing Towards Goal  Note: Fall Risk Interventions:  Mobility Interventions: Patient to call before getting OOB    Mentation Interventions: Door open when patient unattended    Medication Interventions: Patient to call before getting OOB, Teach patient to arise slowly, Bed/chair exit alarm    Elimination Interventions: Call light in reach

## 2020-10-30 NOTE — ROUTINE PROCESS
Patient in bed AAOx4, complaining incisional pain 6/10 gave morphine 2 mg iv. Abdominal dressing with small amount of serosanguinous drainage, NGT to LIWS  Draining grennish liquid , checked for placement and flushed. Will continues to monitor. 0100 Patient confused and crying, States\" someone can walk in my room and attack me you need to lock my door\" Patient reoriented to place and assure that she is safe here. IR=611/ 88    MI=558. Dr Miranda Lorenzana notified New orders. 0131 Patient in bed calmed down EK=508/77, HR=87.

## 2020-10-30 NOTE — PROGRESS NOTES
Bedside shift change report given to Roxy Harris RN (oncoming nurse) by Fab Edouard (offgoing nurse). Report included the following information SBAR, Kardex, Procedure Summary, Intake/Output and MAR.

## 2020-10-30 NOTE — ROUTINE PROCESS
Bedside and Verbal shift change [facility]) by Patel Villanueva RN (offgoing nurse). Report included the following information SBAR, Kardex, Intake/Output, MAR and Recent Results.

## 2020-10-30 NOTE — PROGRESS NOTES
Comprehensive Nutrition Assessment    Type and Reason for Visit: Initial, NPO/clear liquid    Nutrition Recommendations/Plan:   - Monitor GI symptoms and readiness for diet initiation.  - Continue IV fluids until oral diet started and intake adequate. Nutrition Assessment:  Admitted with SBO. S/p ex lap with PATRICIA on 10/29. NPO with NGT to suction since 10/26. Smal BM noted yesterday. Hypokalemic, receiving replacement. Malnutrition Assessment:  Malnutrition Status: At risk for malnutrition (specify)(NPO with altered GI function)      Nutrition History and Allergies: Pt reports good meal intake PTA. She denies changes in wt PTA as well. NKFA. Estimated Daily Nutrient Needs:  Energy (kcal): 0363-1262; Weight Used for Energy Requirements: Current  Protein (g): 57-68; Weight Used for Protein Requirements: Current  Fluid (ml/day): 8774-8810; Weight Used for Fluid Requirements: Current      Nutrition Related Findings:  Small BM 10/29. K 3.1, Phos 2.3. 40 mEq KCl replacement. LR at 125 mL/hr      Wounds:    Surgical incision       Current Nutrition Therapies:  DIET NPO Except Meds    Anthropometric Measures:  · Height:  5' 3\" (160 cm)  · Current Body Wt:  56.7 kg (125 lb)   · Admission Body Wt:  125 lb    · Usual Body Wt:  56.7 kg (125 lb)     · Ideal Body Wt:  115 lbs:  108.7 %   · BMI Category:  Normal weight (BMI 18.5-24. 9)       Nutrition Diagnosis:   · Inadequate oral intake related to altered GI function as evidenced by NPO or clear liquid status due to medical condition      Nutrition Interventions:   Food and/or Nutrient Delivery: Continue NPO, IV fluid delivery  Nutrition Education and Counseling: Education not indicated, Counseling not indicated  Coordination of Nutrition Care: Continue to monitor while inpatient    Goals:  Nutritional needs will be met through adequate oral intake or nutrition support within the next 7 days.        Nutrition Monitoring and Evaluation:   Behavioral-Environmental Outcomes: None identified  Food/Nutrient Intake Outcomes: Diet advancement/tolerance, IVF intake  Physical Signs/Symptoms Outcomes: Biochemical data, GI status, Nausea/vomiting, Fluid status or edema    Discharge Planning:     Too soon to determine     Electronically signed by Chantel Marlow RD on 10/30/2020 at 10:41 AM    Contact: [phone]

## 2020-10-30 NOTE — ACP (ADVANCE CARE PLANNING)
Advance Care Planning     Advance Care Planning Clinical Specialist  Conversation Note      Date of ACP Conversation: 10/26/2020    Conversation Conducted with:   Patient with Decision Making Capacity    ACP Clinical Specialist: Cara Lopez    *When Decision Maker makes decisions on behalf of the incapacitated patient: Elliot Mars is asked to consider and make decisions based on patient values, known preferences, or best interests. Health Care Decision Maker:    Current Designated Health Care Decision Maker:   (If there is a valid [address] named in the [address] [place] [address] Makers\" box in the ACP activity, but it is not visible above, be sure to open that field and then select the health care decision maker relationship (ie \"primary\") in the blank space to the right of the name.) Validate  this information as still accurate & up-to-date; edit [address] field as needed.)    Note: Assess and validate information in current ACP documents, as indicated. If no Decision Maker listed above or available through scanned documents, then:    If no Authorized Decision Maker has previously been identified, then patient chooses [address]:  \"Who would you like to name as your primary health care decision-maker? \"    Name: Mk Montenegro   Relationship: spouse Phone number: [phone] or [phone]  Que Gonsalez this person be reached easily? \" YES  \"Who would you like to name as your back-up decision maker? \"   Name: Cecil Li  Relationship: daughter Phone number: [phone]  Que Gonsalez this person be reached easily? \" YES    Note: If the relationship of these Decision-Makers to the patient does NOT follow your state's Next of Kin hierarchy, recommend that patient complete ACP document that meets state-specific requirements to allow them to act on the patient's behalf when appropriate.     Cara Lopez RN - Outcomes Manager  [phone]

## 2020-10-31 LAB
ANION GAP SERPL CALC-SCNC: 10 MMOL/L (ref 3–18)
BUN SERPL-MCNC: 9 MG/DL (ref 7–18)
BUN/CREAT SERPL: 20 (ref 12–20)
CALCIUM SERPL-MCNC: 8.5 MG/DL (ref 8.5–10.1)
CHLORIDE SERPL-SCNC: 109 MMOL/L (ref 100–111)
CO2 SERPL-SCNC: 24 MMOL/L (ref 21–32)
CREAT SERPL-MCNC: 0.46 MG/DL (ref 0.6–1.3)
ERYTHROCYTE [DISTWIDTH] IN BLOOD BY AUTOMATED COUNT: 12.7 % (ref 11.6–14.5)
GLUCOSE BLD STRIP.AUTO-MCNC: 79 MG/DL (ref 70–110)
GLUCOSE SERPL-MCNC: 76 MG/DL (ref 74–99)
HCT VFR BLD AUTO: 28.1 % (ref 35–45)
HGB BLD-MCNC: 9.6 G/DL (ref 12–16)
MAGNESIUM SERPL-MCNC: 2.2 MG/DL (ref 1.6–2.6)
MCH RBC QN AUTO: 31 PG (ref 24–34)
MCHC RBC AUTO-ENTMCNC: 34.2 G/DL (ref 31–37)
MCV RBC AUTO: 90.6 FL (ref 74–97)
PHOSPHATE SERPL-MCNC: 1.9 MG/DL (ref 2.5–4.9)
PLATELET # BLD AUTO: 188 K/UL (ref 135–420)
PMV BLD AUTO: 10.4 FL (ref 9.2–11.8)
POTASSIUM SERPL-SCNC: 3.3 MMOL/L (ref 3.5–5.5)
RBC # BLD AUTO: 3.1 M/UL (ref 4.2–5.3)
SODIUM SERPL-SCNC: 143 MMOL/L (ref 136–145)
WBC # BLD AUTO: 7.8 K/UL (ref 4.6–13.2)

## 2020-10-31 PROCEDURE — 74011250636 HC RX REV CODE- 250/636: Performed by: SURGERY

## 2020-10-31 PROCEDURE — 74011250636 HC RX REV CODE- 250/636: Performed by: COLON & RECTAL SURGERY

## 2020-10-31 PROCEDURE — 74011250637 HC RX REV CODE- 250/637: Performed by: SURGERY

## 2020-10-31 PROCEDURE — 36415 COLL VENOUS BLD VENIPUNCTURE: CPT

## 2020-10-31 PROCEDURE — 80048 BASIC METABOLIC PNL TOTAL CA: CPT

## 2020-10-31 PROCEDURE — 85027 COMPLETE CBC AUTOMATED: CPT

## 2020-10-31 PROCEDURE — 83735 ASSAY OF MAGNESIUM: CPT

## 2020-10-31 PROCEDURE — 65270000029 HC RM PRIVATE

## 2020-10-31 PROCEDURE — 77030019905 HC CATH URETH INTMIT MDII -A

## 2020-10-31 PROCEDURE — 82962 GLUCOSE BLOOD TEST: CPT

## 2020-10-31 PROCEDURE — 74011000250 HC RX REV CODE- 250: Performed by: COLON & RECTAL SURGERY

## 2020-10-31 PROCEDURE — 2709999900 HC NON-CHARGEABLE SUPPLY

## 2020-10-31 PROCEDURE — 84100 ASSAY OF PHOSPHORUS: CPT

## 2020-10-31 PROCEDURE — 51798 US URINE CAPACITY MEASURE: CPT

## 2020-10-31 RX ORDER — ACETAMINOPHEN 650 MG/1
650 SUPPOSITORY RECTAL
Status: DISCONTINUED | OUTPATIENT
Start: 2020-10-31 | End: 2020-11-04

## 2020-10-31 RX ORDER — POTASSIUM CHLORIDE 7.45 MG/ML
10 INJECTION INTRAVENOUS ONCE
Status: DISCONTINUED | OUTPATIENT
Start: 2020-10-31 | End: 2020-10-31 | Stop reason: SDUPTHER

## 2020-10-31 RX ORDER — POTASSIUM CHLORIDE 7.45 MG/ML
10 INJECTION INTRAVENOUS
Status: COMPLETED | OUTPATIENT
Start: 2020-10-31 | End: 2020-10-31

## 2020-10-31 RX ADMIN — POTASSIUM CHLORIDE 10 MEQ: 7.46 INJECTION, SOLUTION INTRAVENOUS at 09:11

## 2020-10-31 RX ADMIN — SODIUM CHLORIDE, SODIUM LACTATE, POTASSIUM CHLORIDE, AND CALCIUM CHLORIDE 125 ML/HR: 600; 310; 30; 20 INJECTION, SOLUTION INTRAVENOUS at 19:31

## 2020-10-31 RX ADMIN — HEPARIN SODIUM 5000 UNITS: 5000 INJECTION INTRAVENOUS; SUBCUTANEOUS at 05:55

## 2020-10-31 RX ADMIN — HEPARIN SODIUM 5000 UNITS: 5000 INJECTION INTRAVENOUS; SUBCUTANEOUS at 13:21

## 2020-10-31 RX ADMIN — POTASSIUM CHLORIDE 10 MEQ: 7.46 INJECTION, SOLUTION INTRAVENOUS at 10:32

## 2020-10-31 RX ADMIN — FAMOTIDINE 20 MG: 10 INJECTION INTRAVENOUS at 09:12

## 2020-10-31 RX ADMIN — SODIUM CHLORIDE, SODIUM LACTATE, POTASSIUM CHLORIDE, AND CALCIUM CHLORIDE 125 ML/HR: 600; 310; 30; 20 INJECTION, SOLUTION INTRAVENOUS at 09:17

## 2020-10-31 RX ADMIN — FAMOTIDINE 20 MG: 10 INJECTION INTRAVENOUS at 20:51

## 2020-10-31 RX ADMIN — HEPARIN SODIUM 5000 UNITS: 5000 INJECTION INTRAVENOUS; SUBCUTANEOUS at 21:04

## 2020-10-31 RX ADMIN — ACETAMINOPHEN 650 MG: 650 SUPPOSITORY RECTAL at 13:16

## 2020-10-31 RX ADMIN — SODIUM CHLORIDE, SODIUM LACTATE, POTASSIUM CHLORIDE, AND CALCIUM CHLORIDE 125 ML/HR: 600; 310; 30; 20 INJECTION, SOLUTION INTRAVENOUS at 00:26

## 2020-10-31 RX ADMIN — DIPHENHYDRAMINE HYDROCHLORIDE 25 MG: 50 INJECTION, SOLUTION INTRAMUSCULAR; INTRAVENOUS at 01:09

## 2020-10-31 NOTE — PROGRESS NOTES
Progress Note    Patient: Shay Levine MRN: [id number]  SSN: [id number]    YOB: 1955  Age: 71 y.o. Sex: female      Admit Date: 10/26/2020    LOS: 5 days     Subjective:   POD #2 status post exploratory laparotomy lysis of adhesions  Expected incisional pain, ambulatory  NG output recorded 200 cc / 24 hours however is green and color  Denies flatus, BM    Objective:     Vitals:    10/30/20 1619 10/30/20 1935 10/30/20 2322 10/31/20 0400   BP: (!) 154/78 (!) 154/80 (!) 156/81 (!) 154/86   Pulse: 89 95 95 87   Resp: 18 17 16 16   Temp: 99.3 °F (37.4 °C) 99.9 °F (37.7 °C) 98.7 °F (37.1 °C) 98.7 °F (37.1 °C)   SpO2: 97% 99% 98% 97%   Weight:       Height:            Intake and Output:  Current Shift: No intake/output data recorded.   Last three shifts: 10/29 1901 - 10/31 0700  In: 1689.6 [I.V.:1189.6]  Out: 3802 [Urine:1100]    Physical Exam:   General: mild discomfort, conversant, A & O X3, nontoxic  Lungs: CTA, EQual,  Cor: RRR without rub, gallop or murmur  Abd: soft, NABS, nondistended, wounds clean, dry, approximated with appropriate incisional tenderness  Ext: nontender calves      Lab/Data Review:  BMP:   Lab Results   Component Value Date/Time     10/31/2020 03:02 AM    K 3.3 (L) 10/31/2020 03:02 AM     10/31/2020 03:02 AM    CO2 24 10/31/2020 03:02 AM    AGAP 10 10/31/2020 03:02 AM    GLU 76 10/31/2020 03:02 AM    BUN 9 10/31/2020 03:02 AM    CREA 0.46 (L) 10/31/2020 03:02 AM    GFRAA >60 10/31/2020 03:02 AM    GFRNA >60 10/31/2020 03:02 AM     CMP:   Lab Results   Component Value Date/Time     10/31/2020 03:02 AM    K 3.3 (L) 10/31/2020 03:02 AM     10/31/2020 03:02 AM    CO2 24 10/31/2020 03:02 AM    AGAP 10 10/31/2020 03:02 AM    GLU 76 10/31/2020 03:02 AM    BUN 9 10/31/2020 03:02 AM    CREA 0.46 (L) 10/31/2020 03:02 AM    GFRAA >60 10/31/2020 03:02 AM    GFRNA >60 10/31/2020 03:02 AM    CA 8.5 10/31/2020 03:02 AM    MG 2.2 10/31/2020 03:02 AM    PHOS 1.9 (L) 10/31/2020 03:02 AM     CBC:   Lab Results   Component Value Date/Time    WBC 7.8 10/31/2020 03:02 AM    HGB 9.6 (L) 10/31/2020 03:02 AM    HCT 28.1 (L) 10/31/2020 03:02 AM     10/31/2020 03:02 AM     Recent Glucose Results:   Lab Results   Component Value Date/Time    GLU 76 10/31/2020 03:02 AM     Liver Panel: No results found for: ALB, CBIL, TBIL, TP, GLOB, AGRAT, ASTPOC, ALTPOC, ALT, AP  Pancreatic Markers: No results found for: AMYLPOCT, AML, LIPPOCT, LPSE         Assessment:     Active Problems:    SBO (small bowel obstruction) ([facility] [address] (10/26/2020)    Postoperative #2 status post exporter laparotomy lysis adhesionsappropriate postop course    Plan:     Continue n.p.o., nasogastric suction  Supplement potassium  Aggressive pulmonary toilet/mobilization  Discontinue La catheter    Signed By: Nga Lobo DO     October 31, 2020

## 2020-10-31 NOTE — PROGRESS NOTES
Pt tearful, experiencing auditory and visual hallucinations (i.e. hearing banging noises and seeing demons in room), and states \"I am petrified\" and is \"so tired of doing this\". Pt reports pain 6 out of 10 but doesn't want to take PRN analgesic because she notices an increase in psychotic behavior after taking it. Temps also trending up. MD on-call notified. PRN sedative ordered; no change to PRN analgesic; encourage ICS and deep breathing for increasing temp.

## 2020-10-31 NOTE — PROGRESS NOTES
Problem: Pain  Goal: *Control of Pain  Outcome: Progressing Towards Goal     Problem: Patient Education: Go to Patient Education Activity  Goal: Patient/Family Education  Outcome: Progressing Towards Goal     Problem: Falls - Risk of  Goal: *Absence of Falls  Description: Document Chris Ronquillo Fall Risk and appropriate interventions in the flowsheet.   Outcome: Progressing Towards Goal  Note: Fall Risk Interventions:  Mobility Interventions: Patient to call before getting OOB, Bed/chair exit alarm, Utilize walker, cane, or other assistive device    Mentation Interventions: Bed/chair exit alarm, Door open when patient unattended, Reorient patient    Medication Interventions: Bed/chair exit alarm    Elimination Interventions: Call light in reach, Bed/chair exit alarm    Problem: Patient Education: Go to Patient Education Activity  Goal: Patient/Family Education  Outcome: Progressing Towards Goal     Problem: Surgical Pathway Post-Op Day 2 through Discharge  Goal: Treatments/Interventions/Procedures  Outcome: Progressing Towards Goal  Goal: Psychosocial  Outcome: Progressing Towards Goal     Problem: Nutrition Deficit  Goal: *Optimize nutritional status  Outcome: Progressing Towards Goal

## 2020-10-31 NOTE — PROGRESS NOTES
Problem: Pain  Goal: *Control of Pain  Outcome: Progressing Towards Goal     Problem: Patient Education: Go to Patient Education Activity  Goal: Patient/Family Education  Outcome: Progressing Towards Goal     Problem: Falls - Risk of  Goal: *Absence of Falls  Description: Document Kia Beauchamp Fall Risk and appropriate interventions in the flowsheet.   Outcome: Progressing Towards Goal  Note: Fall Risk Interventions:  Mobility Interventions: Patient to call before getting OOB    Mentation Interventions: Bed/chair exit alarm    Medication Interventions: Bed/chair exit alarm    Elimination Interventions: Call light in reach              Problem: Patient Education: Go to Patient Education Activity  Goal: Patient/Family Education  Outcome: Progressing Towards Goal

## 2020-10-31 NOTE — PROGRESS NOTES
Bedside and Verbal shift change report given to [address] Ghass[address] Garsia by [facility]. Report included information from the following: SBAR, Kardex, MAR, I&Os, and psychosocial/neuro assessment.

## 2020-11-01 LAB
ANION GAP SERPL CALC-SCNC: 13 MMOL/L (ref 3–18)
BUN SERPL-MCNC: 6 MG/DL (ref 7–18)
BUN/CREAT SERPL: 26 (ref 12–20)
CALCIUM SERPL-MCNC: 8 MG/DL (ref 8.5–10.1)
CHLORIDE SERPL-SCNC: 109 MMOL/L (ref 100–111)
CO2 SERPL-SCNC: 19 MMOL/L (ref 21–32)
CREAT SERPL-MCNC: 0.23 MG/DL (ref 0.6–1.3)
ERYTHROCYTE [DISTWIDTH] IN BLOOD BY AUTOMATED COUNT: 12.6 % (ref 11.6–14.5)
GLUCOSE BLD STRIP.AUTO-MCNC: 79 MG/DL (ref 70–110)
GLUCOSE SERPL-MCNC: 57 MG/DL (ref 74–99)
HCT VFR BLD AUTO: 22.4 % (ref 35–45)
HGB BLD-MCNC: 7.5 G/DL (ref 12–16)
MAGNESIUM SERPL-MCNC: 1.7 MG/DL (ref 1.6–2.6)
MCH RBC QN AUTO: 30.1 PG (ref 24–34)
MCHC RBC AUTO-ENTMCNC: 33.5 G/DL (ref 31–37)
MCV RBC AUTO: 90 FL (ref 74–97)
PHOSPHATE SERPL-MCNC: 2.2 MG/DL (ref 2.5–4.9)
PLATELET # BLD AUTO: 168 K/UL (ref 135–420)
PMV BLD AUTO: 9.7 FL (ref 9.2–11.8)
POTASSIUM SERPL-SCNC: 3.3 MMOL/L (ref 3.5–5.5)
RBC # BLD AUTO: 2.49 M/UL (ref 4.2–5.3)
SODIUM SERPL-SCNC: 141 MMOL/L (ref 136–145)
WBC # BLD AUTO: 6.6 K/UL (ref 4.6–13.2)

## 2020-11-01 PROCEDURE — 83735 ASSAY OF MAGNESIUM: CPT

## 2020-11-01 PROCEDURE — 74011000250 HC RX REV CODE- 250: Performed by: COLON & RECTAL SURGERY

## 2020-11-01 PROCEDURE — 65270000029 HC RM PRIVATE

## 2020-11-01 PROCEDURE — 51798 US URINE CAPACITY MEASURE: CPT

## 2020-11-01 PROCEDURE — 74011250636 HC RX REV CODE- 250/636: Performed by: COLON & RECTAL SURGERY

## 2020-11-01 PROCEDURE — 74011250636 HC RX REV CODE- 250/636: Performed by: SURGERY

## 2020-11-01 PROCEDURE — 85027 COMPLETE CBC AUTOMATED: CPT

## 2020-11-01 PROCEDURE — 82962 GLUCOSE BLOOD TEST: CPT

## 2020-11-01 PROCEDURE — 2709999900 HC NON-CHARGEABLE SUPPLY

## 2020-11-01 PROCEDURE — 36415 COLL VENOUS BLD VENIPUNCTURE: CPT

## 2020-11-01 PROCEDURE — 84100 ASSAY OF PHOSPHORUS: CPT

## 2020-11-01 PROCEDURE — 80048 BASIC METABOLIC PNL TOTAL CA: CPT

## 2020-11-01 RX ORDER — HYDROMORPHONE HYDROCHLORIDE 1 MG/ML
0.5 INJECTION, SOLUTION INTRAMUSCULAR; INTRAVENOUS; SUBCUTANEOUS
Status: DISCONTINUED | OUTPATIENT
Start: 2020-11-01 | End: 2020-11-05 | Stop reason: HOSPADM

## 2020-11-01 RX ADMIN — HEPARIN SODIUM 5000 UNITS: 5000 INJECTION INTRAVENOUS; SUBCUTANEOUS at 05:21

## 2020-11-01 RX ADMIN — LORAZEPAM 1 MG: 2 INJECTION INTRAMUSCULAR; INTRAVENOUS at 09:03

## 2020-11-01 RX ADMIN — HEPARIN SODIUM 5000 UNITS: 5000 INJECTION INTRAVENOUS; SUBCUTANEOUS at 22:09

## 2020-11-01 RX ADMIN — HYDROMORPHONE HYDROCHLORIDE 0.5 MG: 1 INJECTION, SOLUTION INTRAMUSCULAR; INTRAVENOUS; SUBCUTANEOUS at 18:55

## 2020-11-01 RX ADMIN — SODIUM CHLORIDE, SODIUM LACTATE, POTASSIUM CHLORIDE, AND CALCIUM CHLORIDE 125 ML/HR: 600; 310; 30; 20 INJECTION, SOLUTION INTRAVENOUS at 04:56

## 2020-11-01 RX ADMIN — FAMOTIDINE 20 MG: 10 INJECTION INTRAVENOUS at 22:09

## 2020-11-01 RX ADMIN — SODIUM CHLORIDE, SODIUM LACTATE, POTASSIUM CHLORIDE, AND CALCIUM CHLORIDE 125 ML/HR: 600; 310; 30; 20 INJECTION, SOLUTION INTRAVENOUS at 18:55

## 2020-11-01 RX ADMIN — LORAZEPAM 1 MG: 2 INJECTION INTRAMUSCULAR; INTRAVENOUS at 00:55

## 2020-11-01 RX ADMIN — HYDROMORPHONE HYDROCHLORIDE 0.5 MG: 1 INJECTION, SOLUTION INTRAMUSCULAR; INTRAVENOUS; SUBCUTANEOUS at 14:48

## 2020-11-01 RX ADMIN — FAMOTIDINE 20 MG: 10 INJECTION INTRAVENOUS at 08:40

## 2020-11-01 RX ADMIN — LORAZEPAM 1 MG: 2 INJECTION INTRAMUSCULAR; INTRAVENOUS at 22:24

## 2020-11-01 RX ADMIN — HEPARIN SODIUM 5000 UNITS: 5000 INJECTION INTRAVENOUS; SUBCUTANEOUS at 13:51

## 2020-11-01 NOTE — PROGRESS NOTES
Noted patient to be moving about in the room with out anyone other than family member present. Noted pt to be very unsteady on her feet and stumbling back. Went into the room and educated both pt and  the importance of calling and waiting for assistance prior to getting up and moving about.

## 2020-11-01 NOTE — PROGRESS NOTES
Bedside and Verbal shift change report given to Conrad Yang RN (oncoming nurse) by Adriane Martínez RN (offgoing nurse). Report included the following information SBAR, Kardex, OR Summary, Procedure Summary, Intake/Output and MAR.

## 2020-11-01 NOTE — PROGRESS NOTES
Provider notified this nurse tht the patient had pulled out her NG tube. Per provider no need for re-insertion.   Will continue to monitor

## 2020-11-01 NOTE — PROGRESS NOTES
Bedside and Verbal shift change report given to SYDNIE Ren (oncoming nurse) by me (offgoing nurse).  Report included the following information SBAR, Kardex, Procedure Summary, Intake/Output, MAR, Recent Results and Cardiac Rhythm SR.

## 2020-11-01 NOTE — PROGRESS NOTES
Problem: Pain  Goal: *Control of Pain  Outcome: Progressing Towards Goal     Problem: Patient Education: Go to Patient Education Activity  Goal: Patient/Family Education  Outcome: Progressing Towards Goal     Problem: Falls - Risk of  Goal: *Absence of Falls  Description: Document Libra Okeefe Fall Risk and appropriate interventions in the flowsheet.   Outcome: Progressing Towards Goal  Note: Fall Risk Interventions:  Mobility Interventions: Patient to call before getting OOB    Mentation Interventions: Bed/chair exit alarm, Door open when patient unattended, Reorient patient, Room close to nurse's station, More frequent rounding    Medication Interventions: Patient to call before getting OOB, Teach patient to arise slowly, Bed/chair exit alarm    Elimination Interventions: Bed/chair exit alarm, Call light in reach, Patient to call for help with toileting needs, Stay With Me (per policy), Toileting schedule/hourly rounds, Toilet paper/wipes in reach              Problem: Patient Education: Go to Patient Education Activity  Goal: Patient/Family Education  Outcome: Progressing Towards Goal     Problem: Surgical Pathway Post-Op Day 1  Goal: Off Pathway (Use only if patient is Off Pathway)  Outcome: Progressing Towards Goal  Goal: Activity/Safety  Outcome: Progressing Towards Goal  Goal: Diagnostic Test/Procedures  Outcome: Progressing Towards Goal  Goal: Nutrition/Diet  Outcome: Progressing Towards Goal  Goal: Discharge Planning  Outcome: Progressing Towards Goal  Goal: Medications  Outcome: Progressing Towards Goal  Goal: Respiratory  Outcome: Progressing Towards Goal  Goal: Treatments/Interventions/Procedures  Outcome: Progressing Towards Goal  Goal: Psychosocial  Outcome: Progressing Towards Goal  Goal: *No signs and symptoms of infection or wound complications  Outcome: Progressing Towards Goal  Goal: *Optimal pain control at patient's stated goal  Outcome: Progressing Towards Goal  Goal: *Adequate urinary output (equal to or greater than 30 milliliters/hour)  Outcome: Progressing Towards Goal  Goal: *Hemodynamically stable  Outcome: Progressing Towards Goal  Goal: *Tolerating diet  Outcome: Progressing Towards Goal  Goal: *Demonstrates progressive activity  Outcome: Progressing Towards Goal  Goal: *Lungs clear or at baseline  Outcome: Progressing Towards Goal     Problem: Surgical Pathway Post-Op Day 2 through Discharge  Goal: Treatments/Interventions/Procedures  Outcome: Progressing Towards Goal  Goal: Psychosocial  Outcome: Progressing Towards Goal     Problem: Nutrition Deficit  Goal: *Optimize nutritional status  Outcome: Progressing Towards Goal

## 2020-11-01 NOTE — PROGRESS NOTES
Progress Note    Patient: Kala Post MRN: [id number]  SSN: [id number]    YOB: 1955  Age: 71 y.o. Sex: female      Admit Date: 10/26/2020    LOS: 6 days     Subjective:   POD #3  Self discontinued nasogastric tube this morningminimal output over the last 24 hours  Positive flatus, no BM  Expected incisional pain, ambulatory, voiding spontaneously    Objective:     Vitals:    10/31/20 1554 10/31/20 1956 11/01/20 0405 11/01/20 0839   BP: 135/79 (!) 162/83 128/72 (!) 163/86   Pulse: 93 83 95 90   Resp: 16 16 16 16   Temp: 99.7 °F (37.6 °C) 98.3 °F (36.8 °C) 100.4 °F (38 °C) 98.3 °F (36.8 °C)   SpO2: 97% 97% 95% 99%   Weight:       Height:            Intake and Output:  Current Shift: No intake/output data recorded.   Last three shifts: 10/30 1901 - 11/01 0700  In: 2189.6 [I.V.:2189.6]  Out: 2100 [Urine:1900]    Physical Exam:   General: mild discomfort, conversant, A & O X3, nontoxic  Lungs: CTA, EQual,  Cor: RRR without rub, gallop or murmur  Abd: soft, hypoactive bowel sounds, mildly distended, wounds clean, dry, approximated with appropriate incisional tenderness  Ext: nontender calves      Lab/Data Review:  BMP:   Lab Results   Component Value Date/Time     11/01/2020 04:00 AM    K 3.3 (L) 11/01/2020 04:00 AM     11/01/2020 04:00 AM    CO2 19 (L) 11/01/2020 04:00 AM    AGAP 13 11/01/2020 04:00 AM    GLU 57 (L) 11/01/2020 04:00 AM    BUN 6 (L) 11/01/2020 04:00 AM    CREA 0.23 (L) 11/01/2020 04:00 AM    GFRAA >60 11/01/2020 04:00 AM    GFRNA >60 11/01/2020 04:00 AM     CMP:   Lab Results   Component Value Date/Time     11/01/2020 04:00 AM    K 3.3 (L) 11/01/2020 04:00 AM     11/01/2020 04:00 AM    CO2 19 (L) 11/01/2020 04:00 AM    AGAP 13 11/01/2020 04:00 AM    GLU 57 (L) 11/01/2020 04:00 AM    BUN 6 (L) 11/01/2020 04:00 AM    CREA 0.23 (L) 11/01/2020 04:00 AM    GFRAA >60 11/01/2020 04:00 AM    GFRNA >60 11/01/2020 04:00 AM    CA 8.0 (L) 11/01/2020 04:00 AM    MG 1.7 11/01/2020 04:00 AM    PHOS 2.2 (L) 11/01/2020 04:00 AM     CBC:   Lab Results   Component Value Date/Time    WBC 6.6 11/01/2020 04:00 AM    HGB 7.5 (L) 11/01/2020 04:00 AM    HCT 22.4 (L) 11/01/2020 04:00 AM     11/01/2020 04:00 AM     Recent Glucose Results:   Lab Results   Component Value Date/Time    GLU 57 (L) 11/01/2020 04:00 AM     Liver Panel: No results found for: ALB, CBIL, TBIL, TP, GLOB, AGRAT, ASTPOC, ALTPOC, ALT, AP  Pancreatic Markers: No results found for: AMYLPOCT, AML, LIPPOCT, LPSE         Assessment:     Active Problems:    SBO (small bowel obstruction) ([address] (10/26/2020)    Postoperative day #3 status post oratory laparotomy lysis of adhesions  Plan:     Continue n.p.o.,  Aggressive pulmonary toilet/mobilization  Care as prescribed    Signed By: Red Borjas DO     November 1, 2020

## 2020-11-01 NOTE — PROGRESS NOTES
Called spk with provider in regards to patient uncontrolled pain level. Noted pt to be very unsteady on her feet. Assisted patient to bedside commode several times today and noted some buckling in pt knees and pt sway back and forth. Provider gave order for prn pain medication and PT/OT eval as per family this is not the patients baseline.

## 2020-11-02 LAB
ANION GAP SERPL CALC-SCNC: 11 MMOL/L (ref 3–18)
BUN SERPL-MCNC: 4 MG/DL (ref 7–18)
BUN/CREAT SERPL: 11 (ref 12–20)
CALCIUM SERPL-MCNC: 8.7 MG/DL (ref 8.5–10.1)
CHLORIDE SERPL-SCNC: 107 MMOL/L (ref 100–111)
CO2 SERPL-SCNC: 25 MMOL/L (ref 21–32)
CREAT SERPL-MCNC: 0.36 MG/DL (ref 0.6–1.3)
ERYTHROCYTE [DISTWIDTH] IN BLOOD BY AUTOMATED COUNT: 12.5 % (ref 11.6–14.5)
GLUCOSE SERPL-MCNC: 77 MG/DL (ref 74–99)
HCT VFR BLD AUTO: 27.2 % (ref 35–45)
HGB BLD-MCNC: 9.3 G/DL (ref 12–16)
MAGNESIUM SERPL-MCNC: 1.9 MG/DL (ref 1.6–2.6)
MCH RBC QN AUTO: 30.7 PG (ref 24–34)
MCHC RBC AUTO-ENTMCNC: 34.2 G/DL (ref 31–37)
MCV RBC AUTO: 89.8 FL (ref 74–97)
PHOSPHATE SERPL-MCNC: 2.6 MG/DL (ref 2.5–4.9)
PLATELET # BLD AUTO: 226 K/UL (ref 135–420)
PMV BLD AUTO: 9.5 FL (ref 9.2–11.8)
POTASSIUM SERPL-SCNC: 3 MMOL/L (ref 3.5–5.5)
RBC # BLD AUTO: 3.03 M/UL (ref 4.2–5.3)
SODIUM SERPL-SCNC: 143 MMOL/L (ref 136–145)
WBC # BLD AUTO: 7.5 K/UL (ref 4.6–13.2)

## 2020-11-02 PROCEDURE — 85027 COMPLETE CBC AUTOMATED: CPT

## 2020-11-02 PROCEDURE — 74011250636 HC RX REV CODE- 250/636: Performed by: COLON & RECTAL SURGERY

## 2020-11-02 PROCEDURE — 65270000029 HC RM PRIVATE

## 2020-11-02 PROCEDURE — 74011000250 HC RX REV CODE- 250: Performed by: COLON & RECTAL SURGERY

## 2020-11-02 PROCEDURE — 97535 SELF CARE MNGMENT TRAINING: CPT

## 2020-11-02 PROCEDURE — 36415 COLL VENOUS BLD VENIPUNCTURE: CPT

## 2020-11-02 PROCEDURE — 80048 BASIC METABOLIC PNL TOTAL CA: CPT

## 2020-11-02 PROCEDURE — 84100 ASSAY OF PHOSPHORUS: CPT

## 2020-11-02 PROCEDURE — 74011250636 HC RX REV CODE- 250/636: Performed by: SURGERY

## 2020-11-02 PROCEDURE — 83735 ASSAY OF MAGNESIUM: CPT

## 2020-11-02 PROCEDURE — 2709999900 HC NON-CHARGEABLE SUPPLY

## 2020-11-02 PROCEDURE — 97165 OT EVAL LOW COMPLEX 30 MIN: CPT

## 2020-11-02 PROCEDURE — 97116 GAIT TRAINING THERAPY: CPT

## 2020-11-02 PROCEDURE — 74011250637 HC RX REV CODE- 250/637: Performed by: COLON & RECTAL SURGERY

## 2020-11-02 PROCEDURE — 97162 PT EVAL MOD COMPLEX 30 MIN: CPT

## 2020-11-02 RX ORDER — DEXTROSE, SODIUM CHLORIDE, AND POTASSIUM CHLORIDE 5; .45; .15 G/100ML; G/100ML; G/100ML
75 INJECTION INTRAVENOUS CONTINUOUS
Status: DISCONTINUED | OUTPATIENT
Start: 2020-11-02 | End: 2020-11-04

## 2020-11-02 RX ORDER — POTASSIUM CHLORIDE 7.45 MG/ML
10 INJECTION INTRAVENOUS
Status: COMPLETED | OUTPATIENT
Start: 2020-11-02 | End: 2020-11-02

## 2020-11-02 RX ADMIN — POTASSIUM CHLORIDE 10 MEQ: 7.46 INJECTION, SOLUTION INTRAVENOUS at 10:11

## 2020-11-02 RX ADMIN — FAMOTIDINE 20 MG: 10 INJECTION INTRAVENOUS at 08:00

## 2020-11-02 RX ADMIN — POTASSIUM CHLORIDE, DEXTROSE MONOHYDRATE AND SODIUM CHLORIDE 75 ML/HR: 150; 5; 450 INJECTION, SOLUTION INTRAVENOUS at 21:31

## 2020-11-02 RX ADMIN — HEPARIN SODIUM 5000 UNITS: 5000 INJECTION INTRAVENOUS; SUBCUTANEOUS at 21:31

## 2020-11-02 RX ADMIN — POTASSIUM CHLORIDE, DEXTROSE MONOHYDRATE AND SODIUM CHLORIDE 75 ML/HR: 150; 5; 450 INJECTION, SOLUTION INTRAVENOUS at 07:58

## 2020-11-02 RX ADMIN — POTASSIUM CHLORIDE 10 MEQ: 7.46 INJECTION, SOLUTION INTRAVENOUS at 12:00

## 2020-11-02 RX ADMIN — HYDROMORPHONE HYDROCHLORIDE 0.5 MG: 1 INJECTION, SOLUTION INTRAMUSCULAR; INTRAVENOUS; SUBCUTANEOUS at 05:22

## 2020-11-02 RX ADMIN — POTASSIUM CHLORIDE 10 MEQ: 7.46 INJECTION, SOLUTION INTRAVENOUS at 07:58

## 2020-11-02 RX ADMIN — LORAZEPAM 1 MG: 2 INJECTION INTRAMUSCULAR; INTRAVENOUS at 20:25

## 2020-11-02 RX ADMIN — HEPARIN SODIUM 5000 UNITS: 5000 INJECTION INTRAVENOUS; SUBCUTANEOUS at 14:22

## 2020-11-02 RX ADMIN — FAMOTIDINE 20 MG: 10 INJECTION INTRAVENOUS at 21:31

## 2020-11-02 RX ADMIN — HEPARIN SODIUM 5000 UNITS: 5000 INJECTION INTRAVENOUS; SUBCUTANEOUS at 05:15

## 2020-11-02 RX ADMIN — SODIUM CHLORIDE, SODIUM LACTATE, POTASSIUM CHLORIDE, AND CALCIUM CHLORIDE 125 ML/HR: 600; 310; 30; 20 INJECTION, SOLUTION INTRAVENOUS at 05:15

## 2020-11-02 RX ADMIN — POTASSIUM CHLORIDE 10 MEQ: 7.46 INJECTION, SOLUTION INTRAVENOUS at 09:09

## 2020-11-02 NOTE — PROGRESS NOTES
Problem: Pain  Goal: *Control of Pain  Outcome: Progressing Towards Goal     Problem: Patient Education: Go to Patient Education Activity  Goal: Patient/Family Education  Outcome: Progressing Towards Goal     Problem: Falls - Risk of  Goal: *Absence of Falls  Description: Document Shay Felix Fall Risk and appropriate interventions in the flowsheet.   Outcome: Progressing Towards Goal  Note: Fall Risk Interventions:  Mobility Interventions: Bed/chair exit alarm, Patient to call before getting OOB    Mentation Interventions: Bed/chair exit alarm, Door open when patient unattended, More frequent rounding, Room close to nurse's station    Medication Interventions: Bed/chair exit alarm, Patient to call before getting OOB, Teach patient to arise slowly    Elimination Interventions: Bed/chair exit alarm, Call light in reach, Patient to call for help with toileting needs, Stay With Me (per policy), Toilet paper/wipes in reach              Problem: Patient Education: Go to Patient Education Activity  Goal: Patient/Family Education  Outcome: Progressing Towards Goal     Problem: Surgical Pathway Post-Op Day 1  Goal: Off Pathway (Use only if patient is Off Pathway)  Outcome: Progressing Towards Goal  Goal: Activity/Safety  Outcome: Progressing Towards Goal  Goal: Diagnostic Test/Procedures  Outcome: Progressing Towards Goal  Goal: Nutrition/Diet  Outcome: Progressing Towards Goal  Goal: Discharge Planning  Outcome: Progressing Towards Goal  Goal: Medications  Outcome: Progressing Towards Goal  Goal: Respiratory  Outcome: Progressing Towards Goal  Goal: Treatments/Interventions/Procedures  Outcome: Progressing Towards Goal  Goal: Psychosocial  Outcome: Progressing Towards Goal  Goal: *No signs and symptoms of infection or wound complications  Outcome: Progressing Towards Goal  Goal: *Optimal pain control at patient's stated goal  Outcome: Progressing Towards Goal  Goal: *Adequate urinary output (equal to or greater than 30 milliliters/hour)  Outcome: Progressing Towards Goal  Goal: *Hemodynamically stable  Outcome: Progressing Towards Goal  Goal: *Tolerating diet  Outcome: Progressing Towards Goal  Goal: *Demonstrates progressive activity  Outcome: Progressing Towards Goal  Goal: *Lungs clear or at baseline  Outcome: Progressing Towards Goal     Problem: Surgical Pathway Post-Op Day 2 through Discharge  Goal: Treatments/Interventions/Procedures  Outcome: Progressing Towards Goal  Goal: Psychosocial  Outcome: Progressing Towards Goal     Problem: Nutrition Deficit  Goal: *Optimize nutritional status  Outcome: Progressing Towards Goal

## 2020-11-02 NOTE — PROGRESS NOTES
Problem: Mobility Impaired (Adult and Pediatric)  Goal: *Acute Goals and Plan of Care (Insert Text)  Description: Physical Therapy Goals  Initiated 11/2/2020 and to be accomplished within 7 day(s)  1. Patient will move from supine to sit and sit to supine , scoot up and down, and roll side to side in bed with modified independence. 2.  Patient will transfer from bed to chair and chair to bed with supervision/set-up using the least restrictive device. 3.  Patient will perform sit to stand with supervision/set-up. 4.  Patient will ambulate with supervision/set-up for 150 feet with the least restrictive device. 5.  Patient will ascend/descend 8 stairs with 1 handrail(s) with contact guard assist.     Prior Level of Function:   Patient was independence for all mobility including gait using no AD. Patient lives with  in a 2 story home 8 steps to bedroom with L HRA and 2 steps outside with B HRA. Outcome: Progressing Towards Goal   PHYSICAL THERAPY EVALUATION    Patient: Anupam Factor [de-identified]71 y.o. female)  Date: 11/2/2020  Primary Diagnosis: [facility] [K56.609]  Procedure(s) (LRB):  LAPAROTOMY EXPLORATORY/LYSIS OF ADHESIONS (N/A) 4 Days Post-Op   Precautions:   Fall    ASSESSMENT :  PT orders received and patient cleared by nursing to participate with therapy. Patient is a 71 y.o. female admitted to the hospital due to abdominal pain now s/p ex lap 10/29/2020. Patient consents to PT evaluation and treatment. Pt educated on safety, mobility, therex, log rolling for increased safety, and OOB 3-5 times with nursing assistance. Supine to sit with log rolling contact guard assistance. Sit to stands contact guard assistance/minimal assistance. Gait 25 feet with rolling walker contact guard assistance. Pt has decreased step length and is unsteady with gait. Recommending RW at this time. Pt is also confused/disoriented throughout session only oriented to self.  Pt ended therapy sitting in recliner with alarm donned and all needs met. Patient will benefit from skilled intervention to address the above impairments. Patient's rehabilitation potential is considered to be Good  Factors which may influence rehabilitation potential include:    []         None noted  []         Mental ability/status  [x]         Medical condition  []         Home/family situation and support systems  []         Safety awareness  []         Pain tolerance/management  []         Other:      PLAN :  Recommendations and Planned Interventions:    [x]           Bed Mobility Training             [x]    Neuromuscular Re-Education  [x]           Transfer Training                   []    Orthotic/Prosthetic Training  [x]           Gait Training                          [x]    Modalities  [x]           Therapeutic Exercises           [x]    Edema Management/Control  [x]           Therapeutic Activities            [x]    Family Training/Education  [x]           Patient Education  []           Other (comment):    Frequency/Duration: Patient will be followed by physical therapy 1-2 times per day/4-7 days per week to address goals. Discharge Recommendations: Home Health with supervision for safety  Further Equipment Recommendations for Discharge: shower chair and rolling walker     SUBJECTIVE:   Patient stated Just to and from the refrigerator (pt states only walking and thinking she is at home)  .     OBJECTIVE DATA SUMMARY:   History reviewed. No pertinent past medical history. History reviewed. No pertinent surgical history.   Barriers to Learning/Limitations: yes;  altered mental status (i.e.Sedation, Confusion)  Compensate with: Visual Cues, Verbal Cues, and Tactile Cues  Home Situation:  Home Situation  Home Environment: Private residence  # Steps to Enter: 2  Rails to Enter: Yes  Hand Rails : Bilateral  One/Two Story Residence: Two story  # of Interior Steps: 8  Interior Rails: Right  Living Alone: No  Support Systems: Spouse/Significant Other/Partner  Patient Expects to be Discharged to[de-identified] Private residence  Current DME Used/Available at Home: None  Critical Behavior:  Neurologic State: Alert  Orientation Level: Oriented to person;Disoriented to place; Disoriented to situation;Disoriented to time  Cognition: Follows commands  Safety/Judgement: Fall prevention;Decreased insight into deficits; Decreased awareness of need for safety;Decreased awareness of need for assistance;Decreased awareness of environment  Psychosocial  Patient Behaviors: Calm; Cooperative;Confused  Skin Condition/Temp: Dry;Warm     Skin Integrity: Incision (comment)(midline abdominal incision)  Skin Integumentary  Skin Color: Appropriate for ethnicity  Skin Condition/Temp: Dry;Warm  Skin Integrity: Incision (comment)(midline abdominal incision)     B LE Strength:    Strength: Generally decreased, functional              B LE Tone & Sensation:   Tone: Normal          Sensation: Intact           B LE Range Of Motion:  AROM: Within functional limits                 Posture:  Posture (WDL): Exceptions to WDL  Posture Assessment: Forward head  Functional Mobility:  Bed Mobility:     Supine to Sit: Contact guard assistance     Scooting: Contact guard assistance  Transfers:  Sit to Stand: Contact guard assistance;Minimum assistance   Stand to Sit: Contact guard assistance                       Balance:   Sitting: Impaired  Sitting - Static: Good (unsupported)  Sitting - Dynamic: Good (unsupported); Fair (occasional)  Standing: Impaired; With support  Standing - Static: Fair  Standing - Dynamic : Fair    Ambulation/Gait Training:  Distance (ft): 25 Feet (ft)  Assistive Device: Walker, rolling  Ambulation - Level of Assistance: Contact guard assistance     Gait Abnormalities: Decreased step clearance; Path deviations        Base of Support: Center of gravity altered     Speed/Destiney: Slow  Step Length: Left shortened;Right shortened          Therapeutic Exercises: Reviewed and performed ankle pumps to increase blood flow and circulation. Pain:  No pain noted before, during, or at end of session. Activity Tolerance:   fair  Please refer to the flowsheet for vital signs taken during this treatment. After treatment:   [x]         Patient left in no apparent distress sitting up in chair  []         Patient left in no apparent distress in bed  [x]         Call bell left within reach  [x]   Personal items in reach   [x]         Nursing notified Kennedy Perez  []         Caregiver present  [x]         Bed/chair alarm activated  []         SCDs applied     COMMUNICATION/EDUCATION:   [x]         Role of Physical Therapy in the acute care setting. [x]         Fall prevention education was provided and the patient/caregiver indicated understanding. [x]         Patient/family have participated as able in goal setting and plan of care. [x]         Patient/family agree to work toward stated goals and plan of care. []         Patient understands intent and goals of therapy, but is neutral about his/her participation. []         Patient is unable to participate in goal setting/plan of care: ongoing with therapy staff. [x]         Out of bed with nursing assistance 3-5 times a day. []         Other:     Thank you for this referral.  Hanna Chopra, PT, DPT   Time Calculation: 25 mins      Eval Complexity: History: MEDIUM  Complexity : 1-2 comorbidities / personal factors will impact the outcome/ POC Exam:HIGH Complexity : 4+ Standardized tests and measures addressing body structure, function, activity limitation and / or participation in recreation  Presentation: MEDIUM Complexity : Evolving with changing characteristics  Clinical Decision Making:Medium Complexity    Overall Complexity:MEDIUM

## 2020-11-02 NOTE — PROGRESS NOTES
Bedside and Verbal shift change report given to Magaly Simon RN (oncoming nurse) by Tez Bhat RN (offgoing nurse). Report included the following information SBAR, Kardex, OR Summary, Procedure Summary, Intake/Output and MAR.

## 2020-11-02 NOTE — PROGRESS NOTES
Chart reviewed. PT and OT recommending home health services. Will discuss with patient and have her sign a freedom of choice. Plan remains for discharge home, possibly with home health services.   Shelley Gil RN - Outcomes Manager  [phone]

## 2020-11-02 NOTE — PROGRESS NOTES
[facility] [address]  [phone]  Colon and Rectal Surgery Progress Note      Patient: Indy Winkler MRN: [id number]  SSN: [id number]    YOB: 1955  Age: 71 y.o. Sex: female      Admit Date: 10/26/2020    LOS: 7 days     Subjective:     Small bm    Objective:     Vitals:    11/01/20 1214 11/01/20 1747 11/01/20 1934 11/02/20 0401   BP: (!) 155/92 (!) 147/91 138/87 (!) 155/87   Pulse: 88 89 88 95   Resp: 15 15 16 17   Temp: 99.2 °F (37.3 °C) 98.4 °F (36.9 °C) 98.5 °F (36.9 °C) 99.2 °F (37.3 °C)   SpO2: 97% 97% 97% 100%   Weight:       Height:            Intake and Output:  Current Shift: No intake/output data recorded.   Last three shifts: 10/31 1901 - 11/02 0700  In: 2500 [I.V.:2500]  Out: 2600 [Urine:2600]    Physical Exam:     Constitutional: well developed, in NAD  Abdomen: soft, appr tender, ND    Lab/Data Review:    CMP:   Lab Results   Component Value Date/Time     11/02/2020 04:08 AM    K 3.0 (L) 11/02/2020 04:08 AM     11/02/2020 04:08 AM    CO2 25 11/02/2020 04:08 AM    AGAP 11 11/02/2020 04:08 AM    GLU 77 11/02/2020 04:08 AM    BUN 4 (L) 11/02/2020 04:08 AM    CREA 0.36 (L) 11/02/2020 04:08 AM    GFRAA >60 11/02/2020 04:08 AM    GFRNA >60 11/02/2020 04:08 AM    CA 8.7 11/02/2020 04:08 AM    MG 1.9 11/02/2020 04:08 AM    PHOS 2.6 11/02/2020 04:08 AM     CBC:   Lab Results   Component Value Date/Time    WBC 7.5 11/02/2020 04:08 AM    HGB 9.3 (L) 11/02/2020 04:08 AM    HCT 27.2 (L) 11/02/2020 04:08 AM     11/02/2020 04:08 AM        Assessment:     SBO, s/p ex lap PATRICIA    Plan:     Start clears  Hypokalemia - replete K    Signed By: Elva Swanson MD        November 2, 2020

## 2020-11-02 NOTE — PROGRESS NOTES
Nutrition Assessment     Type and Reason for Visit: Reassess, NPO/clear liquid    Nutrition Recommendations/Plan: Add oral supplement to optimize nutrition intake: Ensure Clear TID    Nutrition Assessment:  NGT removed by pt 11/1. Diet advanced by surgery this AM.  +BM per surgery. Malnutrition Assessment:  Malnutrition Status: At risk for malnutrition (specify)(NPO with altered GI function)     Estimated Daily Nutrient Needs:  Energy (kcal):  1926-8694  Protein (g):  57-68       Fluid (ml/day):  6210-0358    Nutrition Related Findings:  K 3.0, 40 mEq KCl replacement. Current Nutrition Therapies:  DIET CLEAR LIQUID    Anthropometric Measures:  · Height:  5' 3\" (160 cm)  · Current Body Wt:  56.7 kg (125 lb)  · BMI: 22.1    Nutrition Diagnosis:   · Inadequate protein-energy intake related to altered GI function as evidenced by NPO or clear liquid status due to medical condition      Nutrition Intervention:  Food and/or Nutrient Delivery: Continue current diet, IV fluid delivery, Start oral nutrition supplement  Nutrition Education and Counseling: Education not indicated, Counseling not indicated  Coordination of Nutrition Care: Continue to monitor while inpatient    Goals:  Nutritional needs will be met through adequate oral intake or nutrition support within the next 7 days. Nutrition Monitoring and Evaluation:   Behavioral-Environmental Outcomes: None identified  Food/Nutrient Intake Outcomes: Diet advancement/tolerance, Food and nutrient intake, Supplement intake, IVF intake  Physical Signs/Symptoms Outcomes: Biochemical data, GI status, Nausea/vomiting, Fluid status or edema    Discharge Planning:     Too soon to determine     Electronically signed by Palma Pritchard RD on 11/2/2020 at 12:25 PM    Contact Number: [phone]

## 2020-11-02 NOTE — PROGRESS NOTES
AOx3, no apparent distress, had a medium hard BM today, voiced no complaints at this time. Addended by: RUFINA EVERETT on: 4/9/2019 03:59 PM     Modules accepted: Orders

## 2020-11-02 NOTE — PROGRESS NOTES
Problem: Self Care Deficits Care Plan (Adult)  Goal: *Acute Goals and Plan of Care (Insert Text)  Description: Occupational Therapy Goals  Initiated 11/2/2020 within 7 day(s). 1.  Patient will perform all aspects of lower body dressing in standing and sitting with modified independence. 2.  Patient will perform grooming tasks standing at sink with G balance for 8 minutes with independence. 3.  Patient will perform toilet transfers with independence. 4.  Patient will perform all aspects of toileting with independence. Prior Level of Function:  Patient was previously independent with all ADLs without the use of AE/DME. Outcome: Progressing Towards Goal     OCCUPATIONAL THERAPY EVALUATION    Patient: Arvin Loja [de-identified]71 y.o. female)  Date: 11/2/2020  Primary Diagnosis: [facility] [K56.609]  Procedure(s) (LRB):  LAPAROTOMY EXPLORATORY/LYSIS OF ADHESIONS (N/A) 4 Days Post-Op   Precautions:   Fall    ASSESSMENT :  Patient cleared by RN to participate in occupational therapy evaluation. Upon entering room patient received supine in bed texting, alert and agreeable to participate in occupational therapy evaluation. Patient confused and forgetful this session, unable to recall sitting up in chair or ambulating with PT earlier in the day. Patient modified independent during bed mobility in preparation for toileting this session. Once seated EOB patient doffed SCD's with contact guard assistance. Patient observed with fair dynamic sitting balance this session. Patient completed sit > stand, bathroom mobility, and toilet functional transfers this session with hand held-contact guard assistance 2/2 decreased dynamic standing balance. Patient independent to wash hands standing at sink requiring contact guard assistance upon stepping away 2/2 increased risk of LOB. Patient stand > sit with contact guard assistance and moderate verbal cues for proper hand placement on armrests of chair. Patient observed don/doffing L sock with contact guard assistance seated in chair using legs crossed method. Patient requesting ice water this session, independent to bring drink to mouth. Patient left seated in chair with all needs met and chair alarm donned, call bell and phone within reach. Based on the objective data described below, the patient presents with decreased balance, decreased independence, and decreased functional mobility. Patient will benefit from skilled occupational therapy interventions to address the above impairments. Patient's rehabilitation potential is considered to be Good  Factors which may influence rehabilitation potential include:   []             None noted  [x]             Mental ability/status  []             Medical condition  []             Home/family situation and support systems  []             Safety awareness  [x]             Pain tolerance/management  []             Other:      PLAN :  Recommendations and Planned Interventions:   [x]               Self Care Training                  [x]      Therapeutic Activities  [x]               Functional Mobility Training   []      Cognitive Retraining  [x]               Therapeutic Exercises           [x]      Endurance Activities  [x]               Balance Training                    []      Neuromuscular Re-Education  []               Visual/Perceptual Training     [x]      Home Safety Training  [x]               Patient Education                   [x]      Family Training/Education  []               Other (comment):    Frequency/Duration: Patient will be followed by occupational therapy 1-2 times per day/4-7 days per week to address goals. Discharge Recommendations: Home Health Safety Evaluation and 24/7 supervision  Further Equipment Recommendations for Discharge: Shower chair for safety during bathing ADLs. SUBJECTIVE:   Patient stated I have always been clumsy.     OBJECTIVE DATA SUMMARY:   History reviewed.  No pertinent past medical history. History reviewed. No pertinent surgical history. Barriers to Learning/Limitations: yes;  altered mental status (i.e.Sedation, Confusion)  Compensate with: visual, verbal, tactile, kinesthetic cues/model    Home Situation:   Home Situation  Home Environment: Private residence  # Steps to Enter: 2  Rails to Enter: Yes  Hand Rails : Bilateral  One/Two Story Residence: Two story  # of Interior Steps: 21  Interior Rails: Right  Living Alone: No  Support Systems: Spouse/Significant Other/Partner  Patient Expects to be Discharged to[de-identified] Private residence  Current DME Used/Available at Home: None  Tub or Shower Type: Tub/Shower combination  [x]  Right hand dominant   []  Left hand dominant    Cognitive/Behavioral Status:  Neurologic State: Alert  Orientation Level: Oriented X4  Cognition: Follows commands;Decreased attention/concentration  Safety/Judgement: Fall prevention;Decreased awareness of need for assistance;Decreased awareness of need for safety    Skin: Visible skin appears intact  Edema: None noted    Vision/Perceptual:      Acuity: Within Defined Limits    Corrective Lenses: Glasses    Coordination: BUE  Coordination: Within functional limits  Fine Motor Skills-Upper: Left Intact; Right Intact    Gross Motor Skills-Upper: Left Intact; Right Intact    Balance:  Sitting: Impaired  Sitting - Static: Good (unsupported)  Sitting - Dynamic: Good (unsupported); Fair (occasional)  Standing: Impaired; With support(Hand held assist)  Standing - Static: Fair(+)  Standing - Dynamic : Fair    Strength: BUE    Strength: Within functional limits( strength)      Tone & Sensation: BUE    Tone: Normal  Sensation: Intact    Range of Motion: BUE  AROM: Within functional limits    Functional Mobility and Transfers for ADLs:  Bed Mobility:     Supine to Sit: Modified independent; Additional time     Scooting: Modified independent; Additional time  Transfers:  Sit to Stand: Contact guard assistance  Stand to Sit: Contact guard assistance     Bed to Chair: Contact guard assistance     Bathroom Mobility: Contact guard assistance    ADL Assessment:   Feeding: Independent    Oral Facial Hygiene/Grooming: Independent    Bathing: Contact guard assistance    Upper Body Dressing: Independent    Lower Body Dressing: Contact guard assistance    Toileting: Contact guard assistance      ADL Intervention:  Feeding  Feeding Assistance: Independent  Drink to Mouth: Independent    Grooming  Grooming Assistance: Independent  Brushing/Combing Hair: Independent(patient simulated seated EOB)      Upper Body Dressing Assistance  Dressing Assistance: [id number] [facility] [id number]: Independent(donned over back)    Lower Body Dressing Assistance  Dressing Assistance: Contact guard assistance  Socks: Contact guard assistance  Leg Crossed Method Used: Yes  Position Performed: Seated in chair    Toileting  Toileting Assistance: Contact guard assistance  Clothing Management: Contact guard assistance    Cognitive Retraining  Safety/Judgement: Fall prevention;Decreased awareness of need for assistance;Decreased awareness of need for safety    Pain:  Pain level pre-treatment: -/10   Pain level post-treatment: -/10   Pain Intervention(s): Medication (see MAR)  Response to intervention: Nurse notified, See doc flow    Activity Tolerance:   Fair(+)    Please refer to the flowsheet for vital signs taken during this treatment. After treatment:   [x] Patient left in no apparent distress sitting up in chair  [] Patient left in no apparent distress in bed  [x] Call bell left within reach  [x] Nursing notified  [] Caregiver present  [x] Chair alarm activated    COMMUNICATION/EDUCATION:   [x] Role of Occupational Therapy in the acute care setting  [x] Home safety education was provided and the patient/caregiver indicated understanding. [x] Patient/family have participated as able in goal setting and plan of care.   [x] Patient/family agree to work toward stated goals and plan of care. [] Patient understands intent and goals of therapy, but is neutral about his/her participation. [] Patient is unable to participate in goal setting and plan of care. Thank you for this referral.  Quirino Torres  Time Calculation: 24 mins     A student participated in this treatment session. Per CMS Medicare statements and guidelines I certify that the following was true:     1. I was present and directly observed the entire session. 2. I made all skilled judgments and clinical decisions regarding care. 3. I am the practitioner responsible for assessment, treatment, and documentation. Thank you,  Rogelio Allen MS, OTR/L    Eval Complexity: History: MEDIUM Complexity : Expanded review of history including physical, cognitive and psychosocial  history ; Examination: LOW Complexity : 1-3 performance deficits relating to physical, cognitive , or psychosocial skils that result in activity limitations and / or participation restrictions ;    Decision Making:LOW Complexity : No comorbidities that affect functional and no verbal or physical assistance needed to complete eval tasks

## 2020-11-03 LAB
ANION GAP SERPL CALC-SCNC: 7 MMOL/L (ref 3–18)
BUN SERPL-MCNC: 3 MG/DL (ref 7–18)
BUN/CREAT SERPL: 7 (ref 12–20)
CALCIUM SERPL-MCNC: 8.7 MG/DL (ref 8.5–10.1)
CHLORIDE SERPL-SCNC: 107 MMOL/L (ref 100–111)
CO2 SERPL-SCNC: 28 MMOL/L (ref 21–32)
CREAT SERPL-MCNC: 0.45 MG/DL (ref 0.6–1.3)
ERYTHROCYTE [DISTWIDTH] IN BLOOD BY AUTOMATED COUNT: 12.4 % (ref 11.6–14.5)
GLUCOSE SERPL-MCNC: 131 MG/DL (ref 74–99)
HCT VFR BLD AUTO: 25.4 % (ref 35–45)
HGB BLD-MCNC: 8.6 G/DL (ref 12–16)
MAGNESIUM SERPL-MCNC: 1.8 MG/DL (ref 1.6–2.6)
MCH RBC QN AUTO: 30.2 PG (ref 24–34)
MCHC RBC AUTO-ENTMCNC: 33.9 G/DL (ref 31–37)
MCV RBC AUTO: 89.1 FL (ref 74–97)
PHOSPHATE SERPL-MCNC: 2 MG/DL (ref 2.5–4.9)
PLATELET # BLD AUTO: 248 K/UL (ref 135–420)
PMV BLD AUTO: 9.8 FL (ref 9.2–11.8)
POTASSIUM SERPL-SCNC: 2.8 MMOL/L (ref 3.5–5.5)
RBC # BLD AUTO: 2.85 M/UL (ref 4.2–5.3)
SODIUM SERPL-SCNC: 142 MMOL/L (ref 136–145)
WBC # BLD AUTO: 6.1 K/UL (ref 4.6–13.2)

## 2020-11-03 PROCEDURE — 74011250637 HC RX REV CODE- 250/637: Performed by: SURGERY

## 2020-11-03 PROCEDURE — 84100 ASSAY OF PHOSPHORUS: CPT

## 2020-11-03 PROCEDURE — 36415 COLL VENOUS BLD VENIPUNCTURE: CPT

## 2020-11-03 PROCEDURE — 74011250636 HC RX REV CODE- 250/636: Performed by: COLON & RECTAL SURGERY

## 2020-11-03 PROCEDURE — 74011000250 HC RX REV CODE- 250: Performed by: COLON & RECTAL SURGERY

## 2020-11-03 PROCEDURE — 97530 THERAPEUTIC ACTIVITIES: CPT

## 2020-11-03 PROCEDURE — 83735 ASSAY OF MAGNESIUM: CPT

## 2020-11-03 PROCEDURE — 2709999900 HC NON-CHARGEABLE SUPPLY

## 2020-11-03 PROCEDURE — 85027 COMPLETE CBC AUTOMATED: CPT

## 2020-11-03 PROCEDURE — 74011250636 HC RX REV CODE- 250/636: Performed by: SURGERY

## 2020-11-03 PROCEDURE — 65270000029 HC RM PRIVATE

## 2020-11-03 PROCEDURE — 80048 BASIC METABOLIC PNL TOTAL CA: CPT

## 2020-11-03 RX ORDER — POTASSIUM CHLORIDE 7.45 MG/ML
10 INJECTION INTRAVENOUS
Status: COMPLETED | OUTPATIENT
Start: 2020-11-03 | End: 2020-11-03

## 2020-11-03 RX ADMIN — POTASSIUM CHLORIDE 10 MEQ: 7.46 INJECTION, SOLUTION INTRAVENOUS at 11:38

## 2020-11-03 RX ADMIN — HEPARIN SODIUM 5000 UNITS: 5000 INJECTION INTRAVENOUS; SUBCUTANEOUS at 06:44

## 2020-11-03 RX ADMIN — POTASSIUM CHLORIDE 10 MEQ: 7.46 INJECTION, SOLUTION INTRAVENOUS at 08:49

## 2020-11-03 RX ADMIN — POTASSIUM CHLORIDE 10 MEQ: 7.46 INJECTION, SOLUTION INTRAVENOUS at 07:47

## 2020-11-03 RX ADMIN — POTASSIUM CHLORIDE 10 MEQ: 7.46 INJECTION, SOLUTION INTRAVENOUS at 10:05

## 2020-11-03 RX ADMIN — LORAZEPAM 1 MG: 2 INJECTION INTRAMUSCULAR; INTRAVENOUS at 13:05

## 2020-11-03 RX ADMIN — POTASSIUM CHLORIDE 10 MEQ: 7.46 INJECTION, SOLUTION INTRAVENOUS at 12:32

## 2020-11-03 RX ADMIN — POTASSIUM CHLORIDE, DEXTROSE MONOHYDRATE AND SODIUM CHLORIDE 75 ML/HR: 150; 5; 450 INJECTION, SOLUTION INTRAVENOUS at 20:57

## 2020-11-03 RX ADMIN — HEPARIN SODIUM 5000 UNITS: 5000 INJECTION INTRAVENOUS; SUBCUTANEOUS at 21:27

## 2020-11-03 RX ADMIN — FAMOTIDINE 20 MG: 10 INJECTION INTRAVENOUS at 21:26

## 2020-11-03 RX ADMIN — POTASSIUM CHLORIDE 10 MEQ: 7.46 INJECTION, SOLUTION INTRAVENOUS at 13:58

## 2020-11-03 RX ADMIN — HEPARIN SODIUM 5000 UNITS: 5000 INJECTION INTRAVENOUS; SUBCUTANEOUS at 13:57

## 2020-11-03 RX ADMIN — ACETAMINOPHEN 650 MG: 650 SUPPOSITORY RECTAL at 21:44

## 2020-11-03 RX ADMIN — FAMOTIDINE 20 MG: 10 INJECTION INTRAVENOUS at 08:49

## 2020-11-03 NOTE — PROGRESS NOTES
Problem: Self Care Deficits Care Plan (Adult)  Goal: *Acute Goals and Plan of Care (Insert Text)  Description: Occupational Therapy Goals  Initiated 11/2/2020 within 7 day(s). 1.  Patient will perform all aspects of lower body dressing in standing and sitting with modified independence. 2.  Patient will perform grooming tasks standing at sink with G balance for 8 minutes with independence. 3.  Patient will perform toilet transfers with independence. 4.  Patient will perform all aspects of toileting with independence. Prior Level of Function:  Patient was previously independent with all ADLs without the use of AE/DME. Outcome: Progressing Towards Goal   OCCUPATIONAL THERAPY TREATMENT    Patient: Kala Post [de-identified]71 y.o. female)  Date: 11/3/2020  Diagnosis: SBO (small bowel obstruction) ([facility] [K12.041]   <principal problem not specified>  Procedure(s) (LRB):  LAPAROTOMY EXPLORATORY/LYSIS OF ADHESIONS (N/A) 5 Days Post-Op  Precautions: Fall  PLOF: Patient was previously independent with all ADLs without the use of AE/DME. Chart, occupational therapy assessment, plan of care, and goals were reviewed. ASSESSMENT:  Pt presented supine in bed upon therapist arrival and agreeable for tx at this time. Pt demonstrated functional bed mobility supine-sitting EOB Supervision with increased time utilizing SR. Pt sat EOB ~ 5 mins without support reaching forward and OH challenge balance and stability for increased safety and (I) during all ADL tasks. STS transfer CGA with RW and maneuvered in room ~ 20 ft to increase functional activity tolerance needed for self cares, attempted toilet transfer however pt declining. Pt returned to sitting EOB with CGA and became very emotional with bouts of tears. Pt reporting she just needed to cry and wanted to go home. Therapist provided support and pt eventually calmed down. Pt returned to supine in bed Supervision and able to reposition comfortably in bed.  Pt left with all needs within reach. Progression toward goals:  []          Improving appropriately and progressing toward goals  [x]          Improving slowly and progressing toward goals  []          Not making progress toward goals and plan of care will be adjusted     PLAN:  Patient continues to benefit from skilled intervention to address the above impairments. Continue treatment per established plan of care. Discharge Recommendations:   with 24 hour Supervision for safety  Further Equipment Recommendations for Discharge:  shower chair     SUBJECTIVE:   Patient stated  I am just so emotional right now. \"     OBJECTIVE DATA SUMMARY:   Cognitive/Behavioral Status:  Neurologic State: Confused  Orientation Level: Disoriented to place, Disoriented to situation, Disoriented to time  Cognition: Impulsive, Impaired decision making, Poor safety awareness  Safety/Judgement: Fall prevention, Decreased awareness of need for assistance, Decreased awareness of need for safety    Functional Mobility and Transfers for ADLs:   Bed Mobility:     Supine to Sit: Supervision; Additional time     Scooting: Supervision; Additional time   Transfers:  Sit to Stand: Contact guard assistance  Stand to Sit: Contact guard assistance     Balance:  Sitting: Intact  Sitting - Static: Good (unsupported)  Sitting - Dynamic: Fair (occasional)  Standing: Impaired; With support  Standing - Static: Fair  Standing - Dynamic : Fair    Pain:  Pt reports no pain at this time just abdominal discomfort. Activity Tolerance:    Fair  Please refer to the flowsheet for vital signs taken during this treatment.   After treatment:   []  Patient left in no apparent distress sitting up in chair  [x]  Patient left in no apparent distress in bed  [x]  Call bell left within reach  [x]  Nursing notified  []  Caregiver present  []  Bed alarm activated    COMMUNICATION/EDUCATION:   [x] Role of Occupational Therapy in the acute care setting  [x] Home safety education was provided and the patient/caregiver indicated understanding. [x] Patient/family have participated as able in working towards goals and plan of care. [] Patient/family agree to work toward stated goals and plan of care. [] Patient understands intent and goals of therapy, but is neutral about his/her participation. [] Patient is unable to participate in goal setting and plan of care.       Thank you for this referral.  ASHLEE Churchill  Time Calculation: 23 mins

## 2020-11-03 NOTE — PROGRESS NOTES
Attempted to reach nursing supervisor at [address] [place] [address], [facility], [address] [facility],  And [phone]; all unsuccessful.

## 2020-11-03 NOTE — PROGRESS NOTES
Pt confused (disoriented to place, situation, and time) and attempting to get OOB. Pulled out PIV. Unable to find a different site; will seek assistance from Nursing Supervisor.

## 2020-11-03 NOTE — PROGRESS NOTES
[address] [facility] [address]  [phone]  Colon and Rectal Surgery Progress Note      Patient: Sofia Butler MRN: [id number]  SSN: [id number]    YOB: 1955  Age: 71 y.o. Sex: female      Admit Date: 10/26/2020    LOS: 8 days     Subjective:     Continued small bm. Objective:     Vitals:    11/02/20 1212 11/02/20 1508 11/02/20 2048 11/03/20 0507   BP: (!) 170/85 (!) 161/96 (!) 141/84 117/68   Pulse: 97 84 90 90   Resp: 16 20 19 19   Temp: 96.9 °F (36.1 °C) 99 °F (37.2 °C) 97.5 °F (36.4 °C) 98 °F (36.7 °C)   SpO2: 98% 98% 100% 98%   Weight:       Height:            Intake and Output:  Current Shift: No intake/output data recorded. Last three shifts: 11/01 1901 - 11/03 0700  In: 2140 [P.O.:240;  I.V.:1900]  Out: 4000 [Urine:4000]    Physical Exam:     Constitutional: well developed, in NAD  Abdomen: soft, appr tender, ND    Lab/Data Review:    CMP:   Lab Results   Component Value Date/Time     11/03/2020 04:33 AM    K 2.8 (LL) 11/03/2020 04:33 AM     11/03/2020 04:33 AM    CO2 28 11/03/2020 04:33 AM    AGAP 7 11/03/2020 04:33 AM     (H) 11/03/2020 04:33 AM    BUN 3 (L) 11/03/2020 04:33 AM    CREA 0.45 (L) 11/03/2020 04:33 AM    GFRAA >60 11/03/2020 04:33 AM    GFRNA >60 11/03/2020 04:33 AM    CA 8.7 11/03/2020 04:33 AM    MG 1.8 11/03/2020 04:33 AM    PHOS 2.0 (L) 11/03/2020 04:33 AM     CBC:   Lab Results   Component Value Date/Time    WBC 6.1 11/03/2020 04:33 AM    HGB 8.6 (L) 11/03/2020 04:33 AM    HCT 25.4 (L) 11/03/2020 04:33 AM     11/03/2020 04:33 AM        Assessment:     SBO, s/p ex lap PATRICIA    Plan:     Advance to fulls  Hypokalemia - replete K    Signed By: Baalji Ko MD        November 3, 2020

## 2020-11-03 NOTE — PROGRESS NOTES
Bedside and Verbal shift change report given to Abbie (oncoming nurse) by Emely Mchugh RN (offgoing nurse). Report included the following information SBAR and Kardex.

## 2020-11-03 NOTE — PROGRESS NOTES
Bedside and Verbal shift change report given to Pavel Abreu RN (oncoming nurse) by Jess Hanson RN (offgoing nurse). Report included the following information SBAR and Kardex.

## 2020-11-03 NOTE — PROGRESS NOTES
Bedside and Verbal shift change report given to [address] [place] [address] by [employer]. Report included information from the following: SBAR, Kardex, and MAR.

## 2020-11-03 NOTE — PROGRESS NOTES
Problem: Pain  Goal: *Control of Pain  Outcome: Progressing Towards Goal     Problem: Patient Education: Go to Patient Education Activity  Goal: Patient/Family Education  Outcome: Progressing Towards Goal     Problem: Falls - Risk of  Goal: *Absence of Falls  Description: Document Lemon Antoni Fall Risk and appropriate interventions in the flowsheet.   Outcome: Progressing Towards Goal  Note: Fall Risk Interventions:  Mobility Interventions: Bed/chair exit alarm, Patient to call before getting OOB    Mentation Interventions: Bed/chair exit alarm, Reorient patient    Medication Interventions: Teach patient to arise slowly, Patient to call before getting OOB, Bed/chair exit alarm    Elimination Interventions: Call light in reach, Bed/chair exit alarm    Problem: Patient Education: Go to Patient Education Activity  Goal: Patient/Family Education  Outcome: Progressing Towards Goal     Problem: Surgical Pathway Post-Op Day 2 through Discharge  Goal: Treatments/Interventions/Procedures  Outcome: Progressing Towards Goal  Goal: Psychosocial  Outcome: Progressing Towards Goal     Problem: Nutrition Deficit  Goal: *Optimize nutritional status  Outcome: Progressing Towards Goal     Problem: Patient Education: Go to Patient Education Activity  Goal: Patient/Family Education  Outcome: Progressing Towards Goal

## 2020-11-04 LAB
ANION GAP SERPL CALC-SCNC: 4 MMOL/L (ref 3–18)
BUN SERPL-MCNC: 3 MG/DL (ref 7–18)
BUN/CREAT SERPL: 5 (ref 12–20)
CALCIUM SERPL-MCNC: 9 MG/DL (ref 8.5–10.1)
CHLORIDE SERPL-SCNC: 113 MMOL/L (ref 100–111)
CO2 SERPL-SCNC: 28 MMOL/L (ref 21–32)
CREAT SERPL-MCNC: 0.58 MG/DL (ref 0.6–1.3)
ERYTHROCYTE [DISTWIDTH] IN BLOOD BY AUTOMATED COUNT: 12.5 % (ref 11.6–14.5)
GLUCOSE SERPL-MCNC: 104 MG/DL (ref 74–99)
HCT VFR BLD AUTO: 25.8 % (ref 35–45)
HGB BLD-MCNC: 8.6 G/DL (ref 12–16)
MAGNESIUM SERPL-MCNC: 2 MG/DL (ref 1.6–2.6)
MCH RBC QN AUTO: 30.3 PG (ref 24–34)
MCHC RBC AUTO-ENTMCNC: 33.3 G/DL (ref 31–37)
MCV RBC AUTO: 90.8 FL (ref 74–97)
PHOSPHATE SERPL-MCNC: 2.2 MG/DL (ref 2.5–4.9)
PLATELET # BLD AUTO: 258 K/UL (ref 135–420)
PMV BLD AUTO: 9.3 FL (ref 9.2–11.8)
POTASSIUM SERPL-SCNC: 3.6 MMOL/L (ref 3.5–5.5)
RBC # BLD AUTO: 2.84 M/UL (ref 4.2–5.3)
SODIUM SERPL-SCNC: 145 MMOL/L (ref 136–145)
WBC # BLD AUTO: 6.7 K/UL (ref 4.6–13.2)

## 2020-11-04 PROCEDURE — 97530 THERAPEUTIC ACTIVITIES: CPT

## 2020-11-04 PROCEDURE — 74011250636 HC RX REV CODE- 250/636: Performed by: COLON & RECTAL SURGERY

## 2020-11-04 PROCEDURE — 97535 SELF CARE MNGMENT TRAINING: CPT

## 2020-11-04 PROCEDURE — 85027 COMPLETE CBC AUTOMATED: CPT

## 2020-11-04 PROCEDURE — 97116 GAIT TRAINING THERAPY: CPT

## 2020-11-04 PROCEDURE — 77030027138 HC INCENT SPIROMETER -A

## 2020-11-04 PROCEDURE — 74011250637 HC RX REV CODE- 250/637: Performed by: COLON & RECTAL SURGERY

## 2020-11-04 PROCEDURE — 84100 ASSAY OF PHOSPHORUS: CPT

## 2020-11-04 PROCEDURE — 2709999900 HC NON-CHARGEABLE SUPPLY

## 2020-11-04 PROCEDURE — 80048 BASIC METABOLIC PNL TOTAL CA: CPT

## 2020-11-04 PROCEDURE — 83735 ASSAY OF MAGNESIUM: CPT

## 2020-11-04 PROCEDURE — 74011000250 HC RX REV CODE- 250: Performed by: COLON & RECTAL SURGERY

## 2020-11-04 PROCEDURE — 74011250637 HC RX REV CODE- 250/637: Performed by: SURGERY

## 2020-11-04 PROCEDURE — 36415 COLL VENOUS BLD VENIPUNCTURE: CPT

## 2020-11-04 PROCEDURE — 65270000029 HC RM PRIVATE

## 2020-11-04 RX ORDER — ACETAMINOPHEN 325 MG/1
650 TABLET ORAL
Status: DISCONTINUED | OUTPATIENT
Start: 2020-11-04 | End: 2020-11-05 | Stop reason: HOSPADM

## 2020-11-04 RX ADMIN — POTASSIUM CHLORIDE, DEXTROSE MONOHYDRATE AND SODIUM CHLORIDE 75 ML/HR: 150; 5; 450 INJECTION, SOLUTION INTRAVENOUS at 13:21

## 2020-11-04 RX ADMIN — ACETAMINOPHEN 650 MG: 325 TABLET ORAL at 20:10

## 2020-11-04 RX ADMIN — HEPARIN SODIUM 5000 UNITS: 5000 INJECTION INTRAVENOUS; SUBCUTANEOUS at 13:21

## 2020-11-04 RX ADMIN — FAMOTIDINE 20 MG: 10 INJECTION INTRAVENOUS at 21:23

## 2020-11-04 RX ADMIN — HEPARIN SODIUM 5000 UNITS: 5000 INJECTION INTRAVENOUS; SUBCUTANEOUS at 05:34

## 2020-11-04 RX ADMIN — ACETAMINOPHEN 650 MG: 325 TABLET ORAL at 13:20

## 2020-11-04 RX ADMIN — ACETAMINOPHEN 650 MG: 650 SUPPOSITORY RECTAL at 05:42

## 2020-11-04 RX ADMIN — FAMOTIDINE 20 MG: 10 INJECTION INTRAVENOUS at 09:22

## 2020-11-04 RX ADMIN — HEPARIN SODIUM 5000 UNITS: 5000 INJECTION INTRAVENOUS; SUBCUTANEOUS at 21:29

## 2020-11-04 NOTE — PROGRESS NOTES
Problem: Surgical Pathway Post-Op Day 1  Goal: Off Pathway (Use only if patient is Off Pathway)  Outcome: Resolved/Met  Goal: Activity/Safety  Outcome: Resolved/Met  Goal: Diagnostic Test/Procedures  Outcome: Resolved/Met  Goal: Nutrition/Diet  Outcome: Resolved/Met  Goal: Discharge Planning  Outcome: Resolved/Met  Goal: Medications  Outcome: Resolved/Met  Goal: Respiratory  Outcome: Resolved/Met  Goal: Treatments/Interventions/Procedures  Outcome: Resolved/Met  Goal: Psychosocial  Outcome: Resolved/Met  Goal: *No signs and symptoms of infection or wound complications  Outcome: Resolved/Met  Goal: *Optimal pain control at patient's stated goal  Outcome: Resolved/Met  Goal: *Adequate urinary output (equal to or greater than 30 milliliters/hour)  Outcome: Resolved/Met  Goal: *Hemodynamically stable  Outcome: Resolved/Met  Goal: *Tolerating diet  Outcome: Resolved/Met  Goal: *Demonstrates progressive activity  Outcome: Resolved/Met  Goal: *Lungs clear or at baseline  Outcome: Resolved/Met

## 2020-11-04 NOTE — PROGRESS NOTES
Problem: Pain  Goal: *Control of Pain  Outcome: Progressing Towards Goal     Problem: Patient Education: Go to Patient Education Activity  Goal: Patient/Family Education  Outcome: Progressing Towards Goal     Problem: Falls - Risk of  Goal: *Absence of Falls  Description: Document Inocente Brandt Fall Risk and appropriate interventions in the flowsheet.   Outcome: Progressing Towards Goal  Note: Fall Risk Interventions:  Mobility Interventions: Communicate number of staff needed for ambulation/transfer, Patient to call before getting OOB, Utilize walker, cane, or other assistive device    Mentation Interventions: Adequate sleep, hydration, pain control, Evaluate medications/consider consulting pharmacy, Family/sitter at bedside, Increase mobility, Room close to nurse's station, Toileting rounds, Update white board    Medication Interventions: Assess postural VS orthostatic hypotension, Patient to call before getting OOB, Teach patient to arise slowly    Elimination Interventions: Call light in reach, Patient to call for help with toileting needs, Toileting schedule/hourly rounds              Problem: Patient Education: Go to Patient Education Activity  Goal: Patient/Family Education  Outcome: Progressing Towards Goal     Problem: Nutrition Deficit  Goal: *Optimize nutritional status  Outcome: Progressing Towards Goal     Problem: Patient Education: Go to Patient Education Activity  Goal: Patient/Family Education  Outcome: Progressing Towards Goal     Problem: Patient Education: Go to Patient Education Activity  Goal: Patient/Family Education  Outcome: Progressing Towards Goal     Problem: Patient Education: Go to Patient Education Activity  Goal: Patient/Family Education  Outcome: Progressing Towards Goal

## 2020-11-04 NOTE — ROUTINE PROCESS
Bedside shift change report given to Shabnam Tidwell RN (oncoming nurse) by [facility] SYDNIE PURVIS (offgoing nurse). Report included the following information SBAR, Kardex, Procedure Summary, Intake/Output, MAR, Recent Results and Med Rec Status.

## 2020-11-04 NOTE — PROGRESS NOTES
Problem: Self Care Deficits Care Plan (Adult)  Goal: *Acute Goals and Plan of Care (Insert Text)  Description: Occupational Therapy Goals  Initiated 11/2/2020 within 7 day(s). 1.  Patient will perform all aspects of lower body dressing in standing and sitting with modified independence. 2.  Patient will perform grooming tasks standing at sink with G balance for 8 minutes with independence. 3.  Patient will perform toilet transfers with independence. 4.  Patient will perform all aspects of toileting with independence. Prior Level of Function:  Patient was previously independent with all ADLs without the use of AE/DME. Outcome: Progressing Towards Goal   OCCUPATIONAL THERAPY TREATMENT    Patient: Navi Valladares [de-identified]71 y.o. female)  Date: 11/4/2020  Diagnosis: [facility] [U29.438]   <principal problem not specified>  Procedure(s) (LRB):  LAPAROTOMY EXPLORATORY/LYSIS OF ADHESIONS (N/A) 6 Days Post-Op  Precautions: Fall  PLOF: Patient was previously independent with all ADLs without the use of AE/DME. Chart, occupational therapy assessment, plan of care, and goals were reviewed. ASSESSMENT:  Pt presented supine in bed upon therapist arrival with focus on ADL retraining this session. Patient training as well as practiced LB dressing utilizing AD (Reacher, LH sponge, and sock aide). Assessed patient with morning ADLs while sitting EOB and @  order to assess safety, performance, and independence during routine, see below for levels of assist. Pt left supine in bed with HOB elevated, caregiver present, and all needs left within reach. Progression toward goals:  [x]          Improving appropriately and progressing toward goals  []          Improving slowly and progressing toward goals  []          Not making progress toward goals and plan of care will be adjusted     PLAN:  Patient continues to benefit from skilled intervention to address the above impairments.   Continue treatment per established plan of care. Discharge Recommendations: Home Health Safety Evaluation and 24/7 supervision  Further Equipment Recommendations for Discharge:   shower chair     SUBJECTIVE:   Patient stated  I feel like a new women since now I feel clean. \"    OBJECTIVE DATA SUMMARY:   Cognitive/Behavioral Status:  Neurologic State: Alert  Orientation Level: Appropriate for age, Oriented X4  Cognition: Appropriate decision making, Appropriate for age attention/concentration, Appropriate safety awareness, Follows commands  Safety/Judgement: Fall prevention, Decreased awareness of need for assistance, Decreased awareness of need for safety    Functional Mobility and Transfers for ADLs:   Bed Mobility:     Supine to Sit: Supervision; Additional time  Sit to Supine: Supervision; Additional time      Transfers:  Sit to Stand: Contact guard assistance  Stand to Sit: Contact guard assistance       Balance:  Sitting: Intact  Sitting - Static: Good (unsupported)  Sitting - Dynamic: Fair (occasional)  Standing: Impaired; With support  Standing - Static: Fair  Standing - Dynamic : Fair    ADL Intervention:       Grooming  Grooming Assistance: Independent  Position Performed: Seated edge of bed  Washing Face: Independent  Brushing/Combing Hair: Independent    Upper Body Bathing  Bathing Assistance: Modified independent  Position Performed: Seated edge of bed    Lower Body Bathing  Bathing Assistance: Contact guard assistance  Perineal  : Contact guard assistance  Position Performed: Standing  Cues: Verbal cues provided  Lower Body : Supervision  Position Performed: Seated edge of bed  Cues: Verbal cues provided;Visual cues provided  Adaptive Equipment: Long handled sponge    Upper Body Dressing Assistance  Dressing Assistance: Independent  Pullover Shirt: Independent(pullover gown)    Lower Body Dressing Assistance  Dressing Assistance: Stand-by assistance  Socks: Stand-by assistance  Leg Crossed Method Used: Yes  Position Performed: Seated edge of bed  Cues: Verbal cues provided      Pain:  Pt reports no pain at this time. Activity Tolerance:    Fair  Please refer to the flowsheet for vital signs taken during this treatment. After treatment:   []  Patient left in no apparent distress sitting up in chair  [x]  Patient left in no apparent distress in bed  [x]  Call bell left within reach  []  Nursing notified  [x]  Caregiver present  []  Bed alarm activated    COMMUNICATION/EDUCATION:   [] Role of Occupational Therapy in the acute care setting  [x] Home safety education was provided and the patient/caregiver indicated understanding. [x] Patient/family have participated as able in working towards goals and plan of care. [] Patient/family agree to work toward stated goals and plan of care. [] Patient understands intent and goals of therapy, but is neutral about his/her participation. [] Patient is unable to participate in goal setting and plan of care.       Thank you for this referral.  ASHLEE Carrillo  Time Calculation: 38 mins

## 2020-11-04 NOTE — ROUTINE PROCESS
Bedside shift change report given to Lucia Conley RN (oncoming nurse) by Raffi Busby RN (offgoing nurse). Report included the following information SBAR, Kardex, Intake/Output, MAR, Recent Results and Med Rec Status.

## 2020-11-04 NOTE — PROGRESS NOTES
Nutrition Assessment     Type and Reason for Visit: Reassess, NPO/clear liquid    Nutrition Recommendations/Plan:   - Continue current diet as tolerated. - Change nutritional supplements to Ensure Enlive, TID.  - IVF per MD.     Nutrition Assessment:  Pt's daughter at bedside during visit. Diet advanced to full liquids 11/3 and solids 11/4. Pt described around 25% intake today at breakfast, dislikes Ensure Clear- agreeable to modify. +BM today, large per pt's daughter. Report of pt consuming water with lemon and cayenne pepper daily in hopes of promoting wt loss. Discussed healthy wt loss tips but encouraged pt to focus on adequate intake at this time for recovery. Malnutrition Assessment:  Malnutrition Status: At risk for malnutrition (specify)(NPO with altered GI function)     Estimated Daily Nutrient Needs:  Energy (kcal):  9892-6367  Protein (g):  57-68       Fluid (ml/day):  7514-2494    Nutrition Related Findings:  BM 11/4.       Additional Caloric Sources: D5 1/2NS with 20 mEq/L KCl at 75 mL/hr (90 gm dextrose, 306 kcal)    Current Nutrition Therapies:  DIET NUTRITIONAL SUPPLEMENTS Breakfast, Lunch, Dinner; Ensure Clear  DIET REGULAR Low Fiber    Anthropometric Measures:  · Height:  5' 3\" (160 cm)  · Current Body Wt:  56.7 kg (125 lb)  · BMI: 22.1    Nutrition Diagnosis:   · Predicted inadequate energy intake related to altered GI function, early satiety(appetite) as evidenced by intake 26-50%(recent diet advancement to solids 11/4)    Nutrition Intervention:  Food and/or Nutrient Delivery: Continue current diet, Modify oral nutrition supplement, IV fluid delivery  Nutrition Education and Counseling: Survival skills/brief education completed(brief education on low fiber with transition to high fiber discussed with pt and daughter)  Coordination of Nutrition Care: Continue to monitor while inpatient    Goals:  Nutritional needs will be met through adequate oral intake or nutrition support within the next 7 days.       Nutrition Monitoring and Evaluation:   Behavioral-Environmental Outcomes: None identified  Food/Nutrient Intake Outcomes: Diet advancement/tolerance, Food and nutrient intake, Supplement intake, IVF intake  Physical Signs/Symptoms Outcomes: Biochemical data, GI status, Nausea/vomiting, Fluid status or edema    Discharge Planning:    Continue current diet, Continue oral nutrition supplement     Electronically signed by Tisha Colin RD on 11/4/2020 at 12:24 PM    Contact Number: [phone]

## 2020-11-04 NOTE — PROGRESS NOTES
conducted a Follow up consultation and Spiritual Assessment for [facility], who is a 71 y.o.,female.  visited with the family of [facility], who is a 71 y.o.,female. The  provided the following Interventions:  Continued the relationship of care and support for the patient. Initiated a relationship of care and support with the patient's daughter. Listened empathically. Offered assurance of continued prayer on patients behalf. Chart reviewed. The following outcomes were achieved: The patient shared she had been hallucinating, seeing people around her hospital bed and voices in the bathroom, while on a medication last week. Patient expressed gratitude for 's visit. Assessment:  There are no further spiritual or Christian issues which require Spiritual Care Services interventions at this time. Plan:  Chaplains will continue to follow and will provide pastoral care on an as needed/requested basis.  recommends bedside caregivers page  on duty if patient shows signs of acute spiritual or emotional distress.        [address] [place] [address] [place]   [phone]

## 2020-11-04 NOTE — PROGRESS NOTES
Problem: Pain  Goal: *Control of Pain  Outcome: Progressing Towards Goal     Problem: Patient Education: Go to Patient Education Activity  Goal: Patient/Family Education  Outcome: Progressing Towards Goal     Problem: Falls - Risk of  Goal: *Absence of Falls  Description: Document Amira Cowan Fall Risk and appropriate interventions in the flowsheet.   Outcome: Progressing Towards Goal  Note: Fall Risk Interventions:  Mobility Interventions: Assess mobility with egress test, Patient to call before getting OOB, Strengthening exercises (ROM-active/passive)    Mentation Interventions: Adequate sleep, hydration, pain control, Evaluate medications/consider consulting pharmacy, Family/sitter at bedside, Increase mobility, Room close to nurse's station, Toileting rounds, Update white board    Medication Interventions: Assess postural VS orthostatic hypotension, Bed/chair exit alarm, Evaluate medications/consider consulting pharmacy, Patient to call before getting OOB, Teach patient to arise slowly    Elimination Interventions: Bed/chair exit alarm, Call light in reach, Elevated toilet seat, Patient to call for help with toileting needs, Stay With Me (per policy), Toilet paper/wipes in reach              Problem: Patient Education: Go to Patient Education Activity  Goal: Patient/Family Education  Outcome: Progressing Towards Goal     Problem: Surgical Pathway Post-Op Day 2 through Discharge  Goal: Treatments/Interventions/Procedures  Outcome: Progressing Towards Goal  Goal: Psychosocial  Outcome: Progressing Towards Goal     Problem: Nutrition Deficit  Goal: *Optimize nutritional status  Outcome: Progressing Towards Goal     Problem: Patient Education: Go to Patient Education Activity  Goal: Patient/Family Education  Outcome: Progressing Towards Goal     Problem: Patient Education: Go to Patient Education Activity  Goal: Patient/Family Education  Outcome: Progressing Towards Goal     Problem: Surgical Pathway: Discharge Outcomes  Goal: *Hemodynamically stable  Outcome: Progressing Towards Goal  Goal: *Lungs clear or at baseline  Outcome: Progressing Towards Goal  Goal: *Demonstrates independent activity or return to baseline  Outcome: Progressing Towards Goal  Goal: *Optimal pain control at patient's stated goal  Outcome: Progressing Towards Goal  Goal: *Verbalizes understanding and describes prescribed diet  Outcome: Progressing Towards Goal  Goal: *Tolerating diet  Outcome: Progressing Towards Goal  Goal: *Verbalizes name, dosage, time, side effects, and number of days to continue medications  Outcome: Progressing Towards Goal  Goal: *No signs and symptoms of infection or wound complications  Outcome: Progressing Towards Goal  Goal: *Anxiety reduced or absent  Outcome: Progressing Towards Goal  Goal: *Understands and describes signs and symptoms to report to providers(Stroke Metric)  Outcome: Progressing Towards Goal  Goal: *Describes follow-up/return visits to physicians  Outcome: Progressing Towards Goal  Goal: *Describes available resources and support systems  Outcome: Progressing Towards Goal     Problem: Patient Education: Go to Patient Education Activity  Goal: Patient/Family Education  Outcome: Progressing Towards Goal     Problem: Major Small and Large Bowel Procedure: Post-Op Day 3  Goal: Off Pathway (Use only if patient is Off Pathway)  Outcome: Progressing Towards Goal  Goal: Activity/Safety  Outcome: Progressing Towards Goal  Goal: Nutrition/Diet  Outcome: Progressing Towards Goal  Goal: Discharge Planning  Outcome: Progressing Towards Goal  Goal: Medications  Outcome: Progressing Towards Goal  Goal: Respiratory  Outcome: Progressing Towards Goal  Goal: Treatments/Interventions/Procedures  Outcome: Progressing Towards Goal  Goal: Psychosocial  Outcome: Progressing Towards Goal  Goal: *Optimal pain control at patient's stated goal  Outcome: Progressing Towards Goal  Goal: *Adequate urinary output (equal to or greater than 30 milliliters/hour)  Outcome: Progressing Towards Goal  Goal: *Hemodynamically stable  Outcome: Progressing Towards Goal  Goal: *Tolerating diet  Outcome: Progressing Towards Goal  Goal: *Demonstrates progressive activity  Outcome: Progressing Towards Goal  Goal: *Lungs clear or at baseline  Outcome: Progressing Towards Goal  Goal: *Active bowel sounds  Outcome: Progressing Towards Goal  Goal: *Participates in self care  Outcome: Progressing Towards Goal  Goal: *Without signs and symptoms of complications  Outcome: Progressing Towards Goal

## 2020-11-04 NOTE — PROGRESS NOTES
Problem: Mobility Impaired (Adult and Pediatric)  Goal: *Acute Goals and Plan of Care (Insert Text)  Description: Physical Therapy Goals  Initiated 11/2/2020 and to be accomplished within 7 day(s)  1. Patient will move from supine to sit and sit to supine , scoot up and down, and roll side to side in bed with modified independence. 2.  Patient will transfer from bed to chair and chair to bed with supervision/set-up using the least restrictive device. 3.  Patient will perform sit to stand with supervision/set-up. 4.  Patient will ambulate with supervision/set-up for 150 feet with the least restrictive device. 5.  Patient will ascend/descend 8 stairs with 1 handrail(s) with contact guard assist.     Prior Level of Function:   Patient was independence for all mobility including gait using no AD. Patient lives with  in a 2 story home 8 steps to bedroom with L HRA and 2 steps outside with B HRA. Outcome: Progressing Towards Goal    PHYSICAL THERAPY TREATMENT    Patient: Jhonatan Rahman [de-identified]71 y.o. female)  Date: 11/4/2020  Diagnosis: SBO [facility] [K56.609]   <principal problem not specified>  Procedure(s) (LRB):  LAPAROTOMY EXPLORATORY/LYSIS OF ADHESIONS (N/A) 6 Days Post-Op  Precautions: Fall      ASSESSMENT:  Patient received in bed with daughter presents at bedside. Pt consents to PT session and agreeable to ambulate in the hallway. She performs supine to sit with supervision and sit to stand with stand by assistance. Patient reports she does not use RW at home, begins to ambulate with CGA for safety. She is unsteady without AD, given RW to continue ambulating. Patient ambulates 3 laps around the unit with stand by assistance and minimal verbal cues for management of RW. She reports fatigue and returns back to her room. Sit to supine with supervision and scoots up towards to [facility] on her own. Pt verbalizes understanding for use of call bell and left with all needs addressed. Progression toward goals:   [x]      Improving appropriately and progressing toward goals  []      Improving slowly and progressing toward goals  []      Not making progress toward goals and plan of care will be adjusted     PLAN:  Patient continues to benefit from skilled intervention to address the above impairments. Continue treatment per established plan of care. Discharge Recommendations:  Home Health with 24/7 supervision  Further Equipment Recommendations for Discharge:  rolling walker and shower chair     SUBJECTIVE:   Patient stated My daughter came from [place].     OBJECTIVE DATA SUMMARY:   Critical Behavior:  Neurologic State: Alert, Appropriate for age  Orientation Level: Oriented X4  Cognition: Appropriate decision making, Appropriate for age attention/concentration, Appropriate safety awareness, Follows commands  Safety/Judgement: Fall prevention, Decreased awareness of need for assistance, Decreased awareness of need for safety  Functional Mobility Training:  Bed Mobility:     Supine to Sit: Supervision  Sit to Supine: Supervision            Transfers:  Sit to Stand: Stand-by assistance  Stand to Sit: Stand-by assistance          Balance:  Sitting: Intact  Sitting - Static: Good (unsupported)  Sitting - Dynamic: Fair (occasional)  Standing: With support  Standing - Static: Good  Standing - Dynamic : Fair((+))         Ambulation/Gait Training:  Distance (ft): 350 Feet (ft)  Assistive Device: Walker, rolling  Ambulation - Level of Assistance: Stand-by assistance  Gait Abnormalities: Decreased step clearance           Pain:  Pain level pre-treatment: 0/10  Pain level post-treatment: 0/10   Pain Intervention(s): Medication (see MAR); Rest, Ice, Repositioning   Response to intervention: Nurse notified, See doc flow    Activity Tolerance:   Good  Please refer to the flowsheet for vital signs taken during this treatment.   After treatment:   [] Patient left in no apparent distress sitting up in chair  [x] Patient left in no apparent distress in bed  [x] Call bell left within reach  [] Nursing notified  [] Caregiver present  [] Bed alarm activated  [] SCDs applied      COMMUNICATION/EDUCATION:   [x]         Role of Physical Therapy in the acute care setting. [x]         Fall prevention education was provided and the patient/caregiver indicated understanding. [x]         Patient/family have participated as able in working toward goals and plan of care. []         Patient/family agree to work toward stated goals and plan of care. []         Patient understands intent and goals of therapy, but is neutral about his/her participation.   []         Patient is unable to participate in stated goals/plan of care: ongoing with therapy staff.  []         Other:        Francie Tsai, PT   Time Calculation: 18 mins

## 2020-11-04 NOTE — ROUTINE PROCESS
Received report from [facility]. Pt AAOx3, NAD, breathing non labored, on room air, HOB up. IV site clean, dry and intact. IVF going per order. Abdominal midline incision, well-approximated w/ staples in place, open to air. Family member at the bedside. Bed at the lowest level on lock position, call bell w/i reach. Bedside and Verbal shift change report given to SYDNIE Stephens (oncoming nurse) by me (offgoing nurse). Report included the following information SBAR, Kardex, Procedure Summary, Intake/Output, MAR and Recent Results.

## 2020-11-04 NOTE — PROGRESS NOTES
[address] [facility] [address]  [phone]  Colon and Rectal Surgery Progress Note      Patient: Ale Farrell MRN: [id number]  SSN: [id number]    YOB: 1955  Age: 71 y.o. Sex: female      Admit Date: 10/26/2020    LOS: 9 days     Subjective: Tolerating solids + bm. Objective:     Vitals:    11/04/20 0355 11/04/20 0804 11/04/20 0918 11/04/20 1136   BP: 130/86 136/88  (!) 140/87   Pulse: 90 92  85   Resp: 19 18 20   Temp: 98.8 °F (37.1 °C) 99.4 °F (37.4 °C)  98.2 °F (36.8 °C)   SpO2: 99% 100% 100% 99%   Weight:       Height:            Intake and Output:  Current Shift: No intake/output data recorded.   Last three shifts: 11/02 1901 - 11/04 0700  In: [address] [[address]; [address]1300]  Out: 1450 [Urine:1450]    Physical Exam:     Constitutional: well developed, in NAD  Abdomen: soft, appr tender, ND    Lab/Data Review:    CMP:   Lab Results   Component Value Date/Time     11/04/2020 04:24 AM    K 3.6 11/04/2020 04:24 AM     (H) 11/04/2020 04:24 AM    CO2 28 11/04/2020 04:24 AM    AGAP 4 11/04/2020 04:24 AM     (H) 11/04/2020 04:24 AM    BUN 3 (L) 11/04/2020 04:24 AM    CREA 0.58 (L) 11/04/2020 04:24 AM    GFRAA >60 11/04/2020 04:24 AM    GFRNA >60 11/04/2020 04:24 AM    CA 9.0 11/04/2020 04:24 AM    MG 2.0 11/04/2020 04:24 AM    PHOS 2.2 (L) 11/04/2020 04:24 AM     CBC:   Lab Results   Component Value Date/Time    WBC 6.7 11/04/2020 04:24 AM    HGB 8.6 (L) 11/04/2020 04:24 AM    HCT 25.8 (L) 11/04/2020 04:24 AM     11/04/2020 04:24 AM        Assessment:     SBO, s/p ex lap PATRICIA    Plan:     Continue solids  Home tomorrow if tolerates    Signed By: Stephanie Nelson MD        November 4, 2020

## 2020-11-05 VITALS
OXYGEN SATURATION: 99 % | WEIGHT: 125 LBS | HEIGHT: 63 IN | DIASTOLIC BLOOD PRESSURE: 65 MMHG | SYSTOLIC BLOOD PRESSURE: 111 MMHG | BODY MASS INDEX: 22.15 KG/M2 | TEMPERATURE: 98.7 F | HEART RATE: 81 BPM | RESPIRATION RATE: 16 BRPM

## 2020-11-05 LAB
ANION GAP SERPL CALC-SCNC: 6 MMOL/L (ref 3–18)
BUN SERPL-MCNC: 4 MG/DL (ref 7–18)
BUN/CREAT SERPL: 7 (ref 12–20)
CALCIUM SERPL-MCNC: 9.3 MG/DL (ref 8.5–10.1)
CHLORIDE SERPL-SCNC: 112 MMOL/L (ref 100–111)
CO2 SERPL-SCNC: 27 MMOL/L (ref 21–32)
CREAT SERPL-MCNC: 0.56 MG/DL (ref 0.6–1.3)
ERYTHROCYTE [DISTWIDTH] IN BLOOD BY AUTOMATED COUNT: 12.6 % (ref 11.6–14.5)
GLUCOSE SERPL-MCNC: 96 MG/DL (ref 74–99)
HCT VFR BLD AUTO: 26.5 % (ref 35–45)
HGB BLD-MCNC: 8.8 G/DL (ref 12–16)
MAGNESIUM SERPL-MCNC: 1.9 MG/DL (ref 1.6–2.6)
MCH RBC QN AUTO: 30 PG (ref 24–34)
MCHC RBC AUTO-ENTMCNC: 33.2 G/DL (ref 31–37)
MCV RBC AUTO: 90.4 FL (ref 74–97)
PHOSPHATE SERPL-MCNC: 2.9 MG/DL (ref 2.5–4.9)
PLATELET # BLD AUTO: 305 K/UL (ref 135–420)
PMV BLD AUTO: 9.4 FL (ref 9.2–11.8)
POTASSIUM SERPL-SCNC: 3.6 MMOL/L (ref 3.5–5.5)
RBC # BLD AUTO: 2.93 M/UL (ref 4.2–5.3)
SODIUM SERPL-SCNC: 145 MMOL/L (ref 136–145)
WBC # BLD AUTO: 7.6 K/UL (ref 4.6–13.2)

## 2020-11-05 PROCEDURE — 74011250636 HC RX REV CODE- 250/636: Performed by: COLON & RECTAL SURGERY

## 2020-11-05 PROCEDURE — 36415 COLL VENOUS BLD VENIPUNCTURE: CPT

## 2020-11-05 PROCEDURE — 74011000250 HC RX REV CODE- 250: Performed by: COLON & RECTAL SURGERY

## 2020-11-05 PROCEDURE — 83735 ASSAY OF MAGNESIUM: CPT

## 2020-11-05 PROCEDURE — 84100 ASSAY OF PHOSPHORUS: CPT

## 2020-11-05 PROCEDURE — 85027 COMPLETE CBC AUTOMATED: CPT

## 2020-11-05 PROCEDURE — 74011250637 HC RX REV CODE- 250/637: Performed by: COLON & RECTAL SURGERY

## 2020-11-05 PROCEDURE — 80048 BASIC METABOLIC PNL TOTAL CA: CPT

## 2020-11-05 RX ORDER — TRAMADOL HYDROCHLORIDE 50 MG/1
50 TABLET ORAL
Qty: 40 TAB | Refills: 0 | Status: SHIPPED | OUTPATIENT
Start: 2020-11-05 | End: 2020-11-12

## 2020-11-05 RX ADMIN — ACETAMINOPHEN 650 MG: 325 TABLET ORAL at 02:03

## 2020-11-05 RX ADMIN — ACETAMINOPHEN 650 MG: 325 TABLET ORAL at 08:35

## 2020-11-05 NOTE — PROGRESS NOTES
Problem: Pain  Goal: *Control of Pain  11/5/2020 0258 by Pascual Fuller RN  Outcome: Progressing Towards Goal  11/5/2020 0258 by Pascual Fuller RN  Outcome: Progressing Towards Goal     Problem: Patient Education: Go to Patient Education Activity  Goal: Patient/Family Education  11/5/2020 0258 by Pascual Fuller RN  Outcome: Progressing Towards Goal  11/5/2020 0258 by Pascual Fuller RN  Outcome: Progressing Towards Goal     Problem: Falls - Risk of  Goal: *Absence of Falls  Description: Document José Antonio Going Fall Risk and appropriate interventions in the flowsheet.   11/5/2020 0258 by Pascual Fuller RN  Outcome: Progressing Towards Goal  Note: Fall Risk Interventions:  Mobility Interventions: Assess mobility with egress test, Bed/chair exit alarm, Communicate number of staff needed for ambulation/transfer, Patient to call before getting OOB, PT Consult for assist device competence, PT Consult for mobility concerns, Strengthening exercises (ROM-active/passive), Utilize walker, cane, or other assistive device    Mentation Interventions: Adequate sleep, hydration, pain control, Bed/chair exit alarm, Door open when patient unattended, Evaluate medications/consider consulting pharmacy, Eyeglasses and hearing aids, Familiar objects from home, Increase mobility, More frequent rounding, Room close to nurse's station, Toileting rounds, Update white board    Medication Interventions: Assess postural VS orthostatic hypotension, Bed/chair exit alarm, Evaluate medications/consider consulting pharmacy, Patient to call before getting OOB, Teach patient to arise slowly, Utilize gait belt for transfers/ambulation    Elimination Interventions: Bed/chair exit alarm, Call light in reach, Elevated toilet seat, Patient to call for help with toileting needs, Stay With Me (per policy), Toilet paper/wipes in reach, Toileting schedule/hourly rounds, Urinal in reach           11/5/2020 0258 by Pascual Fuller RN  Outcome: Progressing Towards Goal  Note: Fall Risk Interventions:  Mobility Interventions: Assess mobility with egress test, Bed/chair exit alarm, Communicate number of staff needed for ambulation/transfer, Patient to call before getting OOB, PT Consult for assist device competence, PT Consult for mobility concerns, Strengthening exercises (ROM-active/passive), Utilize walker, cane, or other assistive device    Mentation Interventions: Adequate sleep, hydration, pain control, Bed/chair exit alarm, Door open when patient unattended, Evaluate medications/consider consulting pharmacy, Eyeglasses and hearing aids, Familiar objects from home, Increase mobility, More frequent rounding, Room close to nurse's station, Toileting rounds, Update white board    Medication Interventions: Assess postural VS orthostatic hypotension, Bed/chair exit alarm, Evaluate medications/consider consulting pharmacy, Patient to call before getting OOB, Teach patient to arise slowly, Utilize gait belt for transfers/ambulation    Elimination Interventions: Bed/chair exit alarm, Call light in reach, Elevated toilet seat, Patient to call for help with toileting needs, Stay With Me (per policy), Toilet paper/wipes in reach, Toileting schedule/hourly rounds, Urinal in reach              Problem: Patient Education: Go to Patient Education Activity  Goal: Patient/Family Education  11/5/2020 0258 by Octavio Shoemaker RN  Outcome: Progressing Towards Goal  11/5/2020 0258 by Octavio Shoemaker RN  Outcome: Progressing Towards Goal     Problem: Surgical Pathway Post-Op Day 2 through Discharge  Goal: Treatments/Interventions/Procedures  11/5/2020 0258 by Octavio Shoemaker RN  Outcome: Progressing Towards Goal  11/5/2020 0258 by Octavio Shoemaker RN  Outcome: Progressing Towards Goal  Goal: Psychosocial  11/5/2020 0258 by Octavio Shoemaker RN  Outcome: Progressing Towards Goal  11/5/2020 0258 by Octavio Shoemaker RN  Outcome: Progressing Towards Goal     Problem: Nutrition Deficit  Goal: *Optimize nutritional status  11/5/2020 0258 by Duyen Vera RN  Outcome: Progressing Towards Goal  11/5/2020 0258 by Duyen Vera RN  Outcome: Progressing Towards Goal     Problem: Patient Education: Go to Patient Education Activity  Goal: Patient/Family Education  11/5/2020 0258 by Duyen Vera RN  Outcome: Progressing Towards Goal  11/5/2020 0258 by Duyen Vera RN  Outcome: Progressing Towards Goal     Problem: Patient Education: Go to Patient Education Activity  Goal: Patient/Family Education  11/5/2020 0258 by Duyen Vera RN  Outcome: Progressing Towards Goal  11/5/2020 0258 by Duyen Vera RN  Outcome: Progressing Towards Goal     Problem: Surgical Pathway: Discharge Outcomes  Goal: *Hemodynamically stable  11/5/2020 0258 by Duyen Vera RN  Outcome: Progressing Towards Goal  11/5/2020 0258 by Duyen Vera RN  Outcome: Progressing Towards Goal  Goal: *Lungs clear or at baseline  11/5/2020 0258 by Duyen Vera RN  Outcome: Progressing Towards Goal  11/5/2020 0258 by Duyen Vera RN  Outcome: Progressing Towards Goal  Goal: *Demonstrates independent activity or return to baseline  11/5/2020 0258 by Duyen Vera RN  Outcome: Progressing Towards Goal  11/5/2020 0258 by Duyen Vera RN  Outcome: Progressing Towards Goal  Goal: *Optimal pain control at patient's stated goal  11/5/2020 0258 by Duyen Vera RN  Outcome: Progressing Towards Goal  11/5/2020 0258 by Duyen Vera RN  Outcome: Progressing Towards Goal  Goal: *Verbalizes understanding and describes prescribed diet  11/5/2020 0258 by Duyen Vera RN  Outcome: Progressing Towards Goal  11/5/2020 0258 by Duyen Vera RN  Outcome: Progressing Towards Goal  Goal: *Tolerating diet  11/5/2020 0258 by Duyen Vera RN  Outcome: Progressing Towards Goal  11/5/2020 0258 by Duyen Vera RN  Outcome: Progressing Towards Goal  Goal: Zackary Hardy name, dosage, time, side effects, and number of days to continue medications  11/5/2020 0258 by Reji Raines RN  Outcome: Progressing Towards Goal  11/5/2020 0258 by Reji Raines RN  Outcome: Progressing Towards Goal  Goal: *No signs and symptoms of infection or wound complications  39/6/5401 0258 by Reji Raines RN  Outcome: Progressing Towards Goal  11/5/2020 0258 by Reji Raines RN  Outcome: Progressing Towards Goal  Goal: *Anxiety reduced or absent  11/5/2020 0258 by Reji Raines RN  Outcome: Progressing Towards Goal  11/5/2020 0258 by Reji Raines RN  Outcome: Progressing Towards Goal  Goal: *Understands and describes signs and symptoms to report to providers(Stroke Metric)  11/5/2020 0258 by Reji Raines RN  Outcome: Progressing Towards Goal  11/5/2020 0258 by Reji Raines RN  Outcome: Progressing Towards Goal  Goal: *Describes follow-up/return visits to physicians  11/5/2020 0258 by Reji Raines RN  Outcome: Progressing Towards Goal  11/5/2020 0258 by Reji Raines RN  Outcome: Progressing Towards Goal  Goal: *Describes available resources and support systems  11/5/2020 0258 by Reji Raines RN  Outcome: Progressing Towards Goal  11/5/2020 0258 by Reji Raines RN  Outcome: Progressing Towards Goal     Problem: Patient Education: Go to Patient Education Activity  Goal: Patient/Family Education  11/5/2020 0258 by Reji Raines RN  Outcome: Progressing Towards Goal  11/5/2020 0258 by Reji Raines RN  Outcome: Progressing Towards Goal     Problem: Major Small and Large Bowel Procedure: Post-Op Day 3  Goal: Off Pathway (Use only if patient is Off Pathway)  11/5/2020 0258 by Reji Raines RN  Outcome: Progressing Towards Goal  11/5/2020 0258 by Reji Raines RN  Outcome: Progressing Towards Goal  Goal: Activity/Safety  11/5/2020 0258 by Reji Raines RN  Outcome: Progressing Towards Goal  11/5/2020 0258 by Reji aRines RN  Outcome: Progressing Towards Goal  Goal: Nutrition/Diet  11/5/2020 0258 by Artemio Rossi RN  Outcome: Progressing Towards Goal  11/5/2020 0258 by Artemio Rossi RN  Outcome: Progressing Towards Goal  Goal: Discharge Planning  11/5/2020 0258 by Artemio Rossi RN  Outcome: Progressing Towards Goal  11/5/2020 0258 by Artemio Rossi RN  Outcome: Progressing Towards Goal  Goal: Medications  11/5/2020 0258 by Artemio Rossi RN  Outcome: Progressing Towards Goal  11/5/2020 0258 by Artemio Rossi RN  Outcome: Progressing Towards Goal  Goal: Respiratory  11/5/2020 0258 by Artemio Rossi RN  Outcome: Progressing Towards Goal  11/5/2020 0258 by Artemio Rossi RN  Outcome: Progressing Towards Goal  Goal: Treatments/Interventions/Procedures  11/5/2020 0258 by Artemio Rossi RN  Outcome: Progressing Towards Goal  11/5/2020 0258 by Artemio Rossi RN  Outcome: Progressing Towards Goal  Goal: Psychosocial  11/5/2020 0258 by Artemio Rossi RN  Outcome: Progressing Towards Goal  11/5/2020 0258 by Artemio Rossi RN  Outcome: Progressing Towards Goal  Goal: *Optimal pain control at patient's stated goal  11/5/2020 0258 by Artemio Rossi RN  Outcome: Progressing Towards Goal  11/5/2020 0258 by Artemio Rossi RN  Outcome: Progressing Towards Goal  Goal: *Adequate urinary output (equal to or greater than 30 milliliters/hour)  11/5/2020 0258 by Artemio Rossi RN  Outcome: Progressing Towards Goal  11/5/2020 0258 by Artemio Rossi RN  Outcome: Progressing Towards Goal  Goal: *Hemodynamically stable  11/5/2020 0258 by Artemio Rossi RN  Outcome: Progressing Towards Goal  11/5/2020 0258 by Artemio Rossi RN  Outcome: Progressing Towards Goal  Goal: *Tolerating diet  11/5/2020 0258 by Artemio Rossi RN  Outcome: Progressing Towards Goal  11/5/2020 0258 by Artemio Rossi RN  Outcome: Progressing Towards Goal  Goal: *Demonstrates progressive activity  11/5/2020 0258 by Artemio Rossi RN  Outcome: Progressing Towards Goal  11/5/2020 0258 by Ana Varela RN  Outcome: Progressing Towards Goal  Goal: *Lungs clear or at baseline  11/5/2020 0258 by Ana Varela RN  Outcome: Progressing Towards Goal  11/5/2020 0258 by Ana Varela RN  Outcome: Progressing Towards Goal  Goal: *Active bowel sounds  11/5/2020 0258 by Ana Varela RN  Outcome: Progressing Towards Goal  11/5/2020 0258 by Ana Varela RN  Outcome: Progressing Towards Goal  Goal: *Participates in self care  11/5/2020 0258 by Ana Varela RN  Outcome: Progressing Towards Goal  11/5/2020 0258 by Ana Varela RN  Outcome: Progressing Towards Goal  Goal: *Without signs and symptoms of complications  70/1/9600 0258 by Ana Varela RN  Outcome: Progressing Towards Goal  11/5/2020 0258 by Ana Varela RN  Outcome: Progressing Towards Goal

## 2020-11-05 NOTE — PROGRESS NOTES
Problem: Pain  Goal: *Control of Pain  Outcome: Progressing Towards Goal     Problem: Patient Education: Go to Patient Education Activity  Goal: Patient/Family Education  Outcome: Progressing Towards Goal     Problem: Falls - Risk of  Goal: *Absence of Falls  Description: Document Zuleyka Jenkins Fall Risk and appropriate interventions in the flowsheet.   Outcome: Progressing Towards Goal  Note: Fall Risk Interventions:  Mobility Interventions: Assess mobility with egress test, Bed/chair exit alarm, Communicate number of staff needed for ambulation/transfer, Patient to call before getting OOB, PT Consult for assist device competence, PT Consult for mobility concerns, Strengthening exercises (ROM-active/passive), Utilize walker, cane, or other assistive device    Mentation Interventions: Adequate sleep, hydration, pain control, Bed/chair exit alarm, Door open when patient unattended, Evaluate medications/consider consulting pharmacy, Eyeglasses and hearing aids, Familiar objects from home, Increase mobility, More frequent rounding, Room close to nurse's station, Toileting rounds, Update white board    Medication Interventions: Assess postural VS orthostatic hypotension, Bed/chair exit alarm, Evaluate medications/consider consulting pharmacy, Patient to call before getting OOB, Teach patient to arise slowly, Utilize gait belt for transfers/ambulation    Elimination Interventions: Bed/chair exit alarm, Call light in reach, Elevated toilet seat, Patient to call for help with toileting needs, Stay With Me (per policy), Toilet paper/wipes in reach, Toileting schedule/hourly rounds, Urinal in reach              Problem: Patient Education: Go to Patient Education Activity  Goal: Patient/Family Education  Outcome: Progressing Towards Goal     Problem: Surgical Pathway Post-Op Day 2 through Discharge  Goal: Treatments/Interventions/Procedures  Outcome: Progressing Towards Goal  Goal: Psychosocial  Outcome: Progressing Towards Goal Problem: Nutrition Deficit  Goal: *Optimize nutritional status  Outcome: Progressing Towards Goal     Problem: Patient Education: Go to Patient Education Activity  Goal: Patient/Family Education  Outcome: Progressing Towards Goal     Problem: Patient Education: Go to Patient Education Activity  Goal: Patient/Family Education  Outcome: Progressing Towards Goal     Problem: Surgical Pathway: Discharge Outcomes  Goal: *Hemodynamically stable  Outcome: Progressing Towards Goal  Goal: *Lungs clear or at baseline  Outcome: Progressing Towards Goal  Goal: *Demonstrates independent activity or return to baseline  Outcome: Progressing Towards Goal  Goal: *Optimal pain control at patient's stated goal  Outcome: Progressing Towards Goal  Goal: *Verbalizes understanding and describes prescribed diet  Outcome: Progressing Towards Goal  Goal: *Tolerating diet  Outcome: Progressing Towards Goal  Goal: *Verbalizes name, dosage, time, side effects, and number of days to continue medications  Outcome: Progressing Towards Goal  Goal: *No signs and symptoms of infection or wound complications  Outcome: Progressing Towards Goal  Goal: *Anxiety reduced or absent  Outcome: Progressing Towards Goal  Goal: *Understands and describes signs and symptoms to report to providers(Stroke Metric)  Outcome: Progressing Towards Goal  Goal: *Describes follow-up/return visits to physicians  Outcome: Progressing Towards Goal  Goal: *Describes available resources and support systems  Outcome: Progressing Towards Goal     Problem: Patient Education: Go to Patient Education Activity  Goal: Patient/Family Education  Outcome: Progressing Towards Goal     Problem: Major Small and Large Bowel Procedure: Post-Op Day 3  Goal: Off Pathway (Use only if patient is Off Pathway)  Outcome: Progressing Towards Goal  Goal: Activity/Safety  Outcome: Progressing Towards Goal  Goal: Nutrition/Diet  Outcome: Progressing Towards Goal  Goal: Discharge Planning  Outcome: Progressing Towards Goal  Goal: Medications  Outcome: Progressing Towards Goal  Goal: Respiratory  Outcome: Progressing Towards Goal  Goal: Treatments/Interventions/Procedures  Outcome: Progressing Towards Goal  Goal: Psychosocial  Outcome: Progressing Towards Goal  Goal: *Optimal pain control at patient's stated goal  Outcome: Progressing Towards Goal  Goal: *Adequate urinary output (equal to or greater than 30 milliliters/hour)  Outcome: Progressing Towards Goal  Goal: *Hemodynamically stable  Outcome: Progressing Towards Goal  Goal: *Tolerating diet  Outcome: Progressing Towards Goal  Goal: *Demonstrates progressive activity  Outcome: Progressing Towards Goal  Goal: *Lungs clear or at baseline  Outcome: Progressing Towards Goal  Goal: *Active bowel sounds  Outcome: Progressing Towards Goal  Goal: *Participates in self care  Outcome: Progressing Towards Goal  Goal: *Without signs and symptoms of complications  Outcome: Progressing Towards Goal

## 2020-11-05 NOTE — ROUTINE PROCESS
Bedside shift change report given to Consuelo Brown RN (oncoming nurse) by Anjali Mistry RN (offgoing nurse). Report included the following information SBAR, Kardex, Intake/Output, MAR, Recent Results and Med Rec Status.

## 2020-11-05 NOTE — ROUTINE PROCESS
Received report from [facility] POST-ACUTE. Pt AAOx3, NAD, breathing non labored, on room air, HOB up. IV site clean, dry and intact. Abdominal midline ncision  Clean, dry, and intact, open to air, w/ staples in place. Family member at the bedside. Bed at the lowest level on lock position, call bell w/i reach. Bedside and Verbal shift change report given to SYDNIE Stehpens (oncoming nurse) by me (offgoing nurse). Report included the following information SBAR, Kardex, Procedure Summary, Intake/Output, MAR and Recent Results.

## 2020-11-05 NOTE — HOME CARE
Received referral for [facility] for SN however patient's PCP is with [facility]. [facility] is unable to accept referral. Elena Gilmore notified. Referral for [facility] cancelled.     Mariela Flor LPN   [facility] Liaison  [phone]

## 2020-11-05 NOTE — PROGRESS NOTES
Problem: Pain  Goal: *Control of Pain  Outcome: Progressing Towards Goal     Problem: Patient Education: Go to Patient Education Activity  Goal: Patient/Family Education  Outcome: Progressing Towards Goal     Problem: Falls - Risk of  Goal: *Absence of Falls  Description: Document Carly Westfall Fall Risk and appropriate interventions in the flowsheet.   Outcome: Progressing Towards Goal  Note: Fall Risk Interventions:  Mobility Interventions: Communicate number of staff needed for ambulation/transfer, Patient to call before getting OOB, Utilize walker, cane, or other assistive device    Mentation Interventions: Adequate sleep, hydration, pain control, Bed/chair exit alarm, Door open when patient unattended, Evaluate medications/consider consulting pharmacy, Eyeglasses and hearing aids, Familiar objects from home, Increase mobility, More frequent rounding, Room close to nurse's station, Toileting rounds, Update white board    Medication Interventions: Assess postural VS orthostatic hypotension, Patient to call before getting OOB, Teach patient to arise slowly    Elimination Interventions: Call light in reach, Patient to call for help with toileting needs, Toileting schedule/hourly rounds              Problem: Patient Education: Go to Patient Education Activity  Goal: Patient/Family Education  Outcome: Progressing Towards Goal     Problem: Nutrition Deficit  Goal: *Optimize nutritional status  Outcome: Progressing Towards Goal     Problem: Patient Education: Go to Patient Education Activity  Goal: Patient/Family Education  Outcome: Progressing Towards Goal     Problem: Patient Education: Go to Patient Education Activity  Goal: Patient/Family Education  Outcome: Progressing Towards Goal     Problem: Patient Education: Go to Patient Education Activity  Goal: Patient/Family Education  Outcome: Progressing Towards Goal

## 2020-11-05 NOTE — PROGRESS NOTES
Discharge order noted for today. Pt has been accepted to [facility] agency. Met with patient and family present in room and are agreeable to the transition plan today. Transport has been arranged through patients . Patient's discharge summary and home health  orders have been forwarded to [facility] home health  agency via [facility]. Updated bedside RN, Luciano Harding,  to the transition plan. Rolling walker delivered to room; paperwork signed and returned to [facility]N  office. Discharge information has been documented on the AVS.     Pt is with [facility]; not able to be seen by [facility]. Referrals sent via [facility] to [facility]    Received acceptance from [facility]. Booked them in [address].  Updated AVS.      Cynthia Valencia RN - Outcomes Manager  [phone]

## 2020-11-05 NOTE — DISCHARGE SUMMARY
Colon and Rectal Surgery Discharge Summary     Patient: Indy Winkler MRN: [id number]  SSN: [id number]    YOB: 1955  Age: 71 y.o. Sex: female       Admit Date: 10/26/2020    Discharge Date: 11/5/2020      Admission Diagnoses: SBO (small bowel obstruction) ([facility] [K56.609]    Discharge Diagnoses: SBO    Procedure: Ex lap , PATRICIA  Problem List as of 11/5/2020 Never Reviewed          Codes Class Noted - Resolved    SBO (small bowel obstruction) ([facility] ICD-10-CM: I06.826  ICD-9-CM: 560.9  10/26/2020 - Present               Discharge Condition: Good    Hospital Course: Admitted with SBO, attempt at conservative management with failed, taken to OR for ex lap PATRICIA. Post-operatively diet advanced after NG removed. Passing bm and flatus tolerating solids on discharge. Remained afebrile, wbc normal.     Consults: None    Significant Diagnostic Studies: CT showing SBO    Disposition: home    Discharge Medications:   Current Discharge Medication List      START taking these medications    Details   traMADoL (ULTRAM) 50 mg tablet Take 1 Tab by mouth every six (6) hours as needed for Pain for up to 7 days. Max Daily Amount: 200 mg. Qty: 40 Tab, Refills: 0    Associated Diagnoses: SBO (small bowel obstruction) ([facility]         CONTINUE these medications which have NOT CHANGED    Details   amLODIPine (NORVASC) 5 mg tablet Take 5 mg by mouth daily. rosuvastatin (CRESTOR) 5 mg tablet Take 5 mg by mouth nightly. lisinopril-hydroCHLOROthiazide (PRINZIDE, ZESTORETIC) 20-25 mg per tablet Take 1 Tab by mouth daily. metoprolol tartrate (LOPRESSOR) 25 mg tablet Take 25 mg by mouth two (2) times a day. Activity: No heavy lifting for 6 weeks  Diet: Regular Diet  Wound Care: None needed    Follow-up Appointments   Procedures    FOLLOW UP VISIT Appointment in: Two Weeks     Standing Status:   Standing     Number of Occurrences:   1     Order Specific Question:   Appointment in     Answer:    Two Weeks Signed By: Balaji Ko MD     November 5, 2020       .

## 2020-11-19 ENCOUNTER — OFFICE VISIT (OUTPATIENT)
Dept: SURGERY | Age: 65
End: 2020-11-19
Payer: MEDICARE

## 2020-11-19 VITALS
BODY MASS INDEX: 20.91 KG/M2 | WEIGHT: 118 LBS | RESPIRATION RATE: 17 BRPM | SYSTOLIC BLOOD PRESSURE: 93 MMHG | HEIGHT: 63 IN | TEMPERATURE: 97.4 F | HEART RATE: 60 BPM | DIASTOLIC BLOOD PRESSURE: 63 MMHG | OXYGEN SATURATION: 100 %

## 2020-11-19 DIAGNOSIS — K56.609 SBO (SMALL BOWEL OBSTRUCTION) (HCC): ICD-10-CM

## 2020-11-19 DIAGNOSIS — Z12.11 COLON CANCER SCREENING: Primary | ICD-10-CM

## 2020-11-19 PROCEDURE — 99024 POSTOP FOLLOW-UP VISIT: CPT | Performed by: COLON & RECTAL SURGERY

## 2020-11-19 RX ORDER — CITALOPRAM 20 MG/1
TABLET, FILM COATED ORAL DAILY
COMMUNITY

## 2020-11-19 NOTE — LETTER
11/19/20 Patient: [facility] YOB: 1955 Date of Visit: 11/19/2020 Gege Ugalde MD 
[place] [address] VIA Facsimile: [phone] Dear Soledad Chavira, I saw [address] [facility] in the office in follow-up after recent hospitalization at [facility] for small bowel obstruction. The obstruction did not resolve with conservative measures and I took her to the operating room for exploratory laparotomy and lysis of adhesions. Subsequent to that she recovered uneventfully. In the office today she tells me she is tolerating diet and moving her bowels. Her incision is healed. She can follow-up with me in the office as necessary for her issue with her bowel obstruction. She tells me she has not had a screening colonoscopy. We will schedule her for this early next year. If you have questions, please do not hesitate to call me. I look forward to following your patient along with you. Sincerely, Elva Swanson MD

## 2020-12-08 ENCOUNTER — TELEPHONE (OUTPATIENT)
Dept: SURGERY | Age: 65
End: 2020-12-08

## 2020-12-08 NOTE — TELEPHONE ENCOUNTER
This patient was seen for a colonoscopy nurse visit on 11/19/2020. I have called her on 11/19/2020, 11/30/2020 and 12/8/2020 to try and get her scheduled. She has a call screening service on her home number and I have been unable to leave a message. I have left several message on her cell number. She did call me back on 12/8/220 at  4:20pm and states that she wants to wait for warmer weather to schedule this procedure.

## 2021-03-08 ENCOUNTER — TELEPHONE (OUTPATIENT)
Dept: SURGERY | Age: 66
End: 2021-03-08

## 2021-03-08 NOTE — TELEPHONE ENCOUNTER
I called Mrs. Mario Torres on 3/8/2021 at 10:24Am to see if she was ready to schedule a colonoscopy. She stated she wanted to wait for warmer weather and for things to calm down with Covid.

## 2021-10-11 ENCOUNTER — TELEPHONE (OUTPATIENT)
Dept: SURGERY | Age: 66
End: 2021-10-11

## 2021-10-11 NOTE — TELEPHONE ENCOUNTER
On 10/11/2021 at 11:28AM I left a voice message for this patient to call me back and schedule her colonoscopy. The is the 3 left that has been left for her.

## 2022-08-10 NOTE — PROGRESS NOTES
Chart reviewed. Met with patient and spouse at bedside. PT/OT are recommending home health services. Both pt and spouse are okay with having home health for a while; freedom of choice signed for [employer]. PT also recommending Rolling walker and shower chair. Will deliver rolling walker to room tomorrow before discharge but family will get the shower chair on their own.   Keyla Keith RN - Outcomes Manager  [phone] No

## 2023-05-22 ENCOUNTER — TRANSCRIBE ORDERS (OUTPATIENT)
Facility: HOSPITAL | Age: 68
End: 2023-05-22

## 2023-05-22 DIAGNOSIS — M81.0 POSTMENOPAUSAL BONE LOSS: ICD-10-CM

## 2023-05-22 DIAGNOSIS — Z12.31 VISIT FOR SCREENING MAMMOGRAM: Primary | ICD-10-CM

## 2023-07-12 ENCOUNTER — HOSPITAL ENCOUNTER (OUTPATIENT)
Facility: HOSPITAL | Age: 68
Discharge: HOME | End: 2023-07-15
Payer: MEDICARE

## 2023-07-12 DIAGNOSIS — Z12.31 VISIT FOR SCREENING MAMMOGRAM: ICD-10-CM

## 2023-07-12 DIAGNOSIS — M81.0 POSTMENOPAUSAL BONE LOSS: ICD-10-CM

## 2023-07-12 PROCEDURE — 77063 BREAST TOMOSYNTHESIS BI: CPT

## 2023-07-12 PROCEDURE — 77080 DXA BONE DENSITY AXIAL: CPT

## 2023-11-25 ENCOUNTER — HOSPITAL ENCOUNTER (EMERGENCY)
Facility: HOSPITAL | Age: 68
Discharge: HOME | End: 2023-11-25
Attending: STUDENT IN AN ORGANIZED HEALTH CARE EDUCATION/TRAINING PROGRAM
Payer: MEDICARE

## 2023-11-25 ENCOUNTER — APPOINTMENT (OUTPATIENT)
Facility: HOSPITAL | Age: 68
End: 2023-11-25
Payer: MEDICARE

## 2023-11-25 VITALS
RESPIRATION RATE: 18 BRPM | SYSTOLIC BLOOD PRESSURE: 195 MMHG | HEART RATE: 60 BPM | OXYGEN SATURATION: 96 % | TEMPERATURE: 98.5 F | DIASTOLIC BLOOD PRESSURE: 78 MMHG | BODY MASS INDEX: 26.58 KG/M2 | WEIGHT: 150 LBS | HEIGHT: 63 IN

## 2023-11-25 DIAGNOSIS — S93.124A DISLOCATION OF METATARSOPHALANGEAL JOINT OF LESSER TOE OF RIGHT FOOT, INITIAL ENCOUNTER: Primary | ICD-10-CM

## 2023-11-25 PROCEDURE — 73660 X-RAY EXAM OF TOE(S): CPT

## 2023-11-25 PROCEDURE — 99283 EMERGENCY DEPT VISIT LOW MDM: CPT

## 2023-11-25 PROCEDURE — 2500000003 HC RX 250 WO HCPCS

## 2023-11-25 PROCEDURE — 28630 TREAT TOE DISLOCATION: CPT

## 2023-11-25 RX ORDER — LIDOCAINE HYDROCHLORIDE 10 MG/ML
5 INJECTION, SOLUTION EPIDURAL; INFILTRATION; INTRACAUDAL; PERINEURAL
Status: COMPLETED | OUTPATIENT
Start: 2023-11-25 | End: 2023-11-25

## 2023-11-25 RX ADMIN — LIDOCAINE HYDROCHLORIDE 5 ML: 10 INJECTION, SOLUTION EPIDURAL; INFILTRATION; INTRACAUDAL; PERINEURAL at 17:10

## 2023-11-25 ASSESSMENT — ENCOUNTER SYMPTOMS
SORE THROAT: 0
COLOR CHANGE: 1
VOMITING: 0
DIARRHEA: 0
BACK PAIN: 0
NAUSEA: 0
RHINORRHEA: 0
ABDOMINAL PAIN: 0

## 2023-11-25 ASSESSMENT — PAIN - FUNCTIONAL ASSESSMENT: PAIN_FUNCTIONAL_ASSESSMENT: 0-10

## 2023-11-25 ASSESSMENT — PAIN DESCRIPTION - FREQUENCY: FREQUENCY: INTERMITTENT

## 2023-11-25 ASSESSMENT — PAIN DESCRIPTION - LOCATION: LOCATION: TOE (COMMENT WHICH ONE)

## 2023-11-25 ASSESSMENT — PAIN DESCRIPTION - PAIN TYPE: TYPE: ACUTE PAIN

## 2023-11-25 ASSESSMENT — PAIN DESCRIPTION - ORIENTATION: ORIENTATION: RIGHT

## 2023-11-25 ASSESSMENT — PAIN SCALES - GENERAL: PAINLEVEL_OUTOF10: 5

## 2023-11-25 NOTE — ED TRIAGE NOTES
Pt states experienced a trip and fall on Wednesday. C/o right 4th toe pain. States she has the toe festus taped; her family encouraged her to come to ED for evaluation. States she has not had to take any OTC meds for pain since the first day.

## 2023-11-25 NOTE — DISCHARGE INSTRUCTIONS
Your toe has been back into place. It might be sore tonight after the lidocaine wears off. You can use Tylenol and ibuprofen for pain. Can also ice for comfort. Continue with the festus taping for the next 2 weeks, okay to remove to wash foot, but avoid putting weight on it until festus tape is replaced. Use a hard soled shoe such as the one provided when you are walking. This includes if you are walking around your house. If you notice the pain significantly worsens, the affected toe turns blue, please call your PCP or return for evaluation. If pain does not improve in the next 2 to 3 weeks, you can follow-up with orthopedic doctor for further management. Referral information provided in this packet. As we discussed your blood pressure is high. Please continue taking your blood pressure medications daily as prescribed. Also recommend following up with your primary care provider for further management of blood pressure.

## 2023-11-25 NOTE — ED PROVIDER NOTES
EMERGENCY DEPARTMENT HISTORY AND PHYSICAL EXAM    5:46 PM      Date: 11/25/2023  Patient Name: [facility]    History of Presenting Illness     Chief Complaint   Patient presents with    Toe Pain       History From: Patient  HPI    28-year-old woman with hypertension who presents with acute right toe pain. Patient states she has noticed some discomfort in her fourth toe for the past 2 to 3 days. Does not remember an episode or injury to foot that caused the pain. Denies trauma, fall, hitting toe. Noted some bruising across the toe. Able to ambulate. Denies aspirin or blood thinner use. Denies fever, chills, decreased ambulation, numbness or tingling in feet      Nursing Notes were all reviewed and agreed with or any disagreements were addressed in the HPI. PCP: Eric Moore MD    No current facility-administered medications for this encounter. Current Outpatient Medications   Medication Sig Dispense Refill    amLODIPine (NORVASC) 5 MG tablet Take 5 mg by mouth daily      citalopram (CELEXA) 20 MG tablet Take by mouth daily      lisinopril-hydroCHLOROthiazide (PRINZIDE;ZESTORETIC) 20-25 MG per tablet Take 1 tablet by mouth daily      metoprolol tartrate (LOPRESSOR) 25 MG tablet Take 25 mg by mouth 2 times daily      rosuvastatin (CRESTOR) 5 MG tablet Take 5 mg by mouth         Past History     Past Medical History:  Past Medical History:   Diagnosis Date    Hypertension        Past Surgical History:  Past Surgical History:   Procedure Laterality Date    HYSTERECTOMY (CERVIX STATUS UNKNOWN)         Family History:  History reviewed. No pertinent family history. Social History:  Social History     Tobacco Use    Smoking status: Former     Types: Cigarettes    Smokeless tobacco: Never   Vaping Use    Vaping Use: Never used   Substance Use Topics    Alcohol use: Yes     Comment: occasional    Drug use: Never       Allergies:   Allergies   Allergen Reactions    Hydromorphone Other (See distal perfusion: normal  Post-procedure neurological function: normal  Post-procedure range of motion: normal  Patient tolerance: patient tolerated the procedure well with no immediate complications  Comments: Affected toe festus taped after successful reduction              Diagnosis     Clinical Impression:   1. Dislocation of metatarsophalangeal joint of lesser toe of right foot, initial encounter        Disposition: Discharged home    [address]  [address] [facility], [address] [facility]  [phone]    If symptoms worsen    Jeremi Hendrickson MD  [facility]  [phone]    Schedule an appointment as soon as possible for a visit   For blood pressure check       Disclaimer: Sections of this note are dictated using utilizing voice recognition software. Minor typographical errors may be present. If questions arise, please do not hesitate to contact me or call our department.          Kennedi Raymond MD  Resident  11/25/23 [id number]

## 2024-10-14 ENCOUNTER — TRANSCRIBE ORDERS (OUTPATIENT)
Facility: HOSPITAL | Age: 69
End: 2024-10-14

## 2024-10-14 DIAGNOSIS — Z12.31 VISIT FOR SCREENING MAMMOGRAM: Primary | ICD-10-CM

## 2024-10-28 ENCOUNTER — HOSPITAL ENCOUNTER (OUTPATIENT)
Facility: HOSPITAL | Age: 69
Discharge: HOME | End: 2024-10-31
Payer: MEDICARE

## 2024-10-28 VITALS — BODY MASS INDEX: 26.58 KG/M2 | WEIGHT: 150 LBS | HEIGHT: 63 IN

## 2024-10-28 DIAGNOSIS — Z12.31 VISIT FOR SCREENING MAMMOGRAM: ICD-10-CM

## 2024-10-28 PROCEDURE — 77063 BREAST TOMOSYNTHESIS BI: CPT

## (undated) DEVICE — BLADE ELECTRODE: Brand: EDGE

## (undated) DEVICE — SUTURE VCRL SZ 0 L18IN ABSRB UD POLYGLACTIN 910 BRAID TIE J912G

## (undated) DEVICE — SOLUTION SCRB 4OZ 10% PVP I POVIDONE IOD TOP PAINT EXIDINE

## (undated) DEVICE — INTENDED FOR TISSUE SEPARATION, AND OTHER PROCEDURES THAT REQUIRE A SHARP SURGICAL BLADE TO PUNCTURE OR CUT.: Brand: BARD-PARKER SAFETY BLADES SIZE 10, STERILE

## (undated) DEVICE — STERILE POLYISOPRENE POWDER-FREE SURGICAL GLOVES: Brand: PROTEXIS

## (undated) DEVICE — KIT CLN UP BON SECOURS MARYV

## (undated) DEVICE — SPONGE LAP 18X18IN STRL -- 5/PK

## (undated) DEVICE — INTENDED FOR TISSUE SEPARATION, AND OTHER PROCEDURES THAT REQUIRE A SHARP SURGICAL BLADE TO PUNCTURE OR CUT.: Brand: BARD-PARKER SAFETY BLADES SIZE 15, STERILE

## (undated) DEVICE — CURVED, LARGE JAW, OPEN SEALER/DIVIDER NANO-COATED: Brand: LIGASURE IMPACT

## (undated) DEVICE — REM POLYHESIVE ADULT PATIENT RETURN ELECTRODE: Brand: VALLEYLAB

## (undated) DEVICE — NEEDLE HYPO 22GA L1.5IN BLK S STL HUB POLYPR SHLD REG BVL

## (undated) DEVICE — GAUZE,SPONGE,4"X4",16PLY,STRL,LF,10/TRAY: Brand: MEDLINE

## (undated) DEVICE — SUTURE PDS II SZ 1 L96IN ABSRB VLT TP-1 L65MM 1/2 CIR Z880G

## (undated) DEVICE — TAPE ADH CLTH SILK H2O REPELLENT CURAD

## (undated) DEVICE — SYR 10ML CTRL LR LCK NSAF LF --

## (undated) DEVICE — SUTURE PDS II SZ 1 L36IN ABSRB VLT CTXB L48MM 1/2 CIR BLNT ZB371

## (undated) DEVICE — FLEX ADVANTAGE 3000CC: Brand: FLEX ADVANTAGE

## (undated) DEVICE — SOLUTION IV 1000ML 0.9% SOD CHL

## (undated) DEVICE — SUTURE VCRL SZ 3-0 L27IN ABSRB VLT L26MM SH 1/2 CIR J316H

## (undated) DEVICE — BLANKET WRM AD W50XL85.8IN PACU FULL BODY FORC AIR

## (undated) DEVICE — WATERPROOF, BACTERIA PROOF DRESSING WITH ABSORBENT SEE THROUGH PAD: Brand: OPSITE POST-OP VISIBLE 25X10CM CTN 20

## (undated) DEVICE — SUTURE VCRL SZ 3-0 L18IN ABSRB UD L26MM SH 1/2 CIR J864D

## (undated) DEVICE — GARMENT,MEDLINE,DVT,SEQUENTIAL,CALF,MD: Brand: MEDLINE

## (undated) DEVICE — THREE-QUARTER SHEET: Brand: CONVERTORS

## (undated) DEVICE — PACK PROCEDURE SURG MAJ W/ BASIN LF

## (undated) DEVICE — Device